# Patient Record
Sex: MALE | Race: ASIAN
[De-identification: names, ages, dates, MRNs, and addresses within clinical notes are randomized per-mention and may not be internally consistent; named-entity substitution may affect disease eponyms.]

---

## 2020-12-03 ENCOUNTER — HOSPITAL ENCOUNTER (INPATIENT)
Dept: HOSPITAL 72 - EMR | Age: 70
LOS: 5 days | Discharge: NURSING, REHAB OR LONG-TERM CARE | DRG: 871 | End: 2020-12-08
Payer: MEDICARE

## 2020-12-03 VITALS — WEIGHT: 114 LBS | HEIGHT: 68 IN | BODY MASS INDEX: 17.28 KG/M2

## 2020-12-03 VITALS — DIASTOLIC BLOOD PRESSURE: 76 MMHG | SYSTOLIC BLOOD PRESSURE: 110 MMHG

## 2020-12-03 VITALS — DIASTOLIC BLOOD PRESSURE: 58 MMHG | SYSTOLIC BLOOD PRESSURE: 126 MMHG

## 2020-12-03 VITALS — DIASTOLIC BLOOD PRESSURE: 68 MMHG | SYSTOLIC BLOOD PRESSURE: 123 MMHG

## 2020-12-03 VITALS — DIASTOLIC BLOOD PRESSURE: 54 MMHG | SYSTOLIC BLOOD PRESSURE: 120 MMHG

## 2020-12-03 VITALS — SYSTOLIC BLOOD PRESSURE: 111 MMHG | DIASTOLIC BLOOD PRESSURE: 63 MMHG

## 2020-12-03 VITALS — SYSTOLIC BLOOD PRESSURE: 98 MMHG | DIASTOLIC BLOOD PRESSURE: 55 MMHG

## 2020-12-03 VITALS — DIASTOLIC BLOOD PRESSURE: 64 MMHG | SYSTOLIC BLOOD PRESSURE: 118 MMHG

## 2020-12-03 VITALS — DIASTOLIC BLOOD PRESSURE: 62 MMHG | SYSTOLIC BLOOD PRESSURE: 127 MMHG

## 2020-12-03 VITALS — DIASTOLIC BLOOD PRESSURE: 42 MMHG | SYSTOLIC BLOOD PRESSURE: 78 MMHG

## 2020-12-03 VITALS — DIASTOLIC BLOOD PRESSURE: 53 MMHG | SYSTOLIC BLOOD PRESSURE: 95 MMHG

## 2020-12-03 VITALS — SYSTOLIC BLOOD PRESSURE: 112 MMHG | DIASTOLIC BLOOD PRESSURE: 49 MMHG

## 2020-12-03 VITALS — SYSTOLIC BLOOD PRESSURE: 131 MMHG | DIASTOLIC BLOOD PRESSURE: 64 MMHG

## 2020-12-03 VITALS — DIASTOLIC BLOOD PRESSURE: 54 MMHG | SYSTOLIC BLOOD PRESSURE: 104 MMHG

## 2020-12-03 VITALS — DIASTOLIC BLOOD PRESSURE: 70 MMHG | SYSTOLIC BLOOD PRESSURE: 108 MMHG

## 2020-12-03 VITALS — DIASTOLIC BLOOD PRESSURE: 43 MMHG | SYSTOLIC BLOOD PRESSURE: 122 MMHG

## 2020-12-03 VITALS — DIASTOLIC BLOOD PRESSURE: 51 MMHG | SYSTOLIC BLOOD PRESSURE: 95 MMHG

## 2020-12-03 VITALS — SYSTOLIC BLOOD PRESSURE: 94 MMHG | DIASTOLIC BLOOD PRESSURE: 50 MMHG

## 2020-12-03 DIAGNOSIS — E87.8: ICD-10-CM

## 2020-12-03 DIAGNOSIS — Z22.322: ICD-10-CM

## 2020-12-03 DIAGNOSIS — E46: ICD-10-CM

## 2020-12-03 DIAGNOSIS — N39.0: ICD-10-CM

## 2020-12-03 DIAGNOSIS — D64.9: ICD-10-CM

## 2020-12-03 DIAGNOSIS — I25.10: ICD-10-CM

## 2020-12-03 DIAGNOSIS — R65.21: ICD-10-CM

## 2020-12-03 DIAGNOSIS — K76.6: ICD-10-CM

## 2020-12-03 DIAGNOSIS — G92: ICD-10-CM

## 2020-12-03 DIAGNOSIS — Z79.82: ICD-10-CM

## 2020-12-03 DIAGNOSIS — A41.9: Primary | ICD-10-CM

## 2020-12-03 DIAGNOSIS — K74.60: ICD-10-CM

## 2020-12-03 DIAGNOSIS — J98.11: ICD-10-CM

## 2020-12-03 DIAGNOSIS — I45.10: ICD-10-CM

## 2020-12-03 DIAGNOSIS — Z86.19: ICD-10-CM

## 2020-12-03 DIAGNOSIS — N17.9: ICD-10-CM

## 2020-12-03 DIAGNOSIS — I50.9: ICD-10-CM

## 2020-12-03 DIAGNOSIS — K72.90: ICD-10-CM

## 2020-12-03 DIAGNOSIS — Z86.73: ICD-10-CM

## 2020-12-03 DIAGNOSIS — R13.10: ICD-10-CM

## 2020-12-03 DIAGNOSIS — M62.82: ICD-10-CM

## 2020-12-03 DIAGNOSIS — F03.91: ICD-10-CM

## 2020-12-03 DIAGNOSIS — K21.9: ICD-10-CM

## 2020-12-03 DIAGNOSIS — J96.91: ICD-10-CM

## 2020-12-03 DIAGNOSIS — E78.5: ICD-10-CM

## 2020-12-03 DIAGNOSIS — E86.0: ICD-10-CM

## 2020-12-03 LAB
ADD MANUAL DIFF: NO
ALBUMIN SERPL-MCNC: 2.5 G/DL (ref 3.4–5)
ALBUMIN/GLOB SERPL: 0.6 {RATIO} (ref 1–2.7)
ALP SERPL-CCNC: 74 U/L (ref 46–116)
ALT SERPL-CCNC: 20 U/L (ref 12–78)
ANION GAP SERPL CALC-SCNC: 10 MMOL/L (ref 5–15)
APPEARANCE UR: CLEAR
APTT BLD: 31 SEC (ref 23–33)
APTT PPP: YELLOW S
AST SERPL-CCNC: 82 U/L (ref 15–37)
BASOPHILS NFR BLD AUTO: 0.9 % (ref 0–2)
BILIRUB SERPL-MCNC: 0.7 MG/DL (ref 0.2–1)
BUN SERPL-MCNC: 35 MG/DL (ref 7–18)
CALCIUM SERPL-MCNC: 8.4 MG/DL (ref 8.5–10.1)
CHLORIDE SERPL-SCNC: 107 MMOL/L (ref 98–107)
CK MB SERPL-MCNC: 36 NG/ML (ref 0–3.6)
CO2 SERPL-SCNC: 22 MMOL/L (ref 21–32)
CREAT SERPL-MCNC: 1.5 MG/DL (ref 0.55–1.3)
EOSINOPHIL NFR BLD AUTO: 2 % (ref 0–3)
ERYTHROCYTE [DISTWIDTH] IN BLOOD BY AUTOMATED COUNT: 16.7 % (ref 11.6–14.8)
GLOBULIN SER-MCNC: 4 G/DL
GLUCOSE UR STRIP-MCNC: NEGATIVE MG/DL
HCT VFR BLD CALC: 27.9 % (ref 42–52)
HGB BLD-MCNC: 9.5 G/DL (ref 14.2–18)
INR PPP: 1.1 (ref 0.9–1.1)
KETONES UR QL STRIP: (no result)
LEUKOCYTE ESTERASE UR QL STRIP: NEGATIVE
LYMPHOCYTES NFR BLD AUTO: 20.6 % (ref 20–45)
MCV RBC AUTO: 90 FL (ref 80–99)
MONOCYTES NFR BLD AUTO: 11.5 % (ref 1–10)
NEUTROPHILS NFR BLD AUTO: 65.1 % (ref 45–75)
NITRITE UR QL STRIP: NEGATIVE
PH UR STRIP: 6 [PH] (ref 4.5–8)
PHOSPHATE SERPL-MCNC: 3.2 MG/DL (ref 2.5–4.9)
PLATELET # BLD: 161 K/UL (ref 150–450)
POTASSIUM SERPL-SCNC: 4.3 MMOL/L (ref 3.5–5.1)
PROT UR QL STRIP: (no result)
RBC # BLD AUTO: 3.1 M/UL (ref 4.7–6.1)
SODIUM SERPL-SCNC: 139 MMOL/L (ref 136–145)
SP GR UR STRIP: 1.01 (ref 1–1.03)
UROBILINOGEN UR-MCNC: 4 MG/DL (ref 0–1)
WBC # BLD AUTO: 11.5 K/UL (ref 4.8–10.8)

## 2020-12-03 PROCEDURE — 86803 HEPATITIS C AB TEST: CPT

## 2020-12-03 PROCEDURE — 99285 EMERGENCY DEPT VISIT HI MDM: CPT

## 2020-12-03 PROCEDURE — 93970 EXTREMITY STUDY: CPT

## 2020-12-03 PROCEDURE — 83880 ASSAY OF NATRIURETIC PEPTIDE: CPT

## 2020-12-03 PROCEDURE — 82607 VITAMIN B-12: CPT

## 2020-12-03 PROCEDURE — 86140 C-REACTIVE PROTEIN: CPT

## 2020-12-03 PROCEDURE — 82140 ASSAY OF AMMONIA: CPT

## 2020-12-03 PROCEDURE — 85007 BL SMEAR W/DIFF WBC COUNT: CPT

## 2020-12-03 PROCEDURE — 85025 COMPLETE CBC W/AUTO DIFF WBC: CPT

## 2020-12-03 PROCEDURE — 83550 IRON BINDING TEST: CPT

## 2020-12-03 PROCEDURE — 87181 SC STD AGAR DILUTION PER AGT: CPT

## 2020-12-03 PROCEDURE — 83540 ASSAY OF IRON: CPT

## 2020-12-03 PROCEDURE — 71045 X-RAY EXAM CHEST 1 VIEW: CPT

## 2020-12-03 PROCEDURE — 84443 ASSAY THYROID STIM HORMONE: CPT

## 2020-12-03 PROCEDURE — 82977 ASSAY OF GGT: CPT

## 2020-12-03 PROCEDURE — 80053 COMPREHEN METABOLIC PANEL: CPT

## 2020-12-03 PROCEDURE — 83605 ASSAY OF LACTIC ACID: CPT

## 2020-12-03 PROCEDURE — 82728 ASSAY OF FERRITIN: CPT

## 2020-12-03 PROCEDURE — 81003 URINALYSIS AUTO W/O SCOPE: CPT

## 2020-12-03 PROCEDURE — 84484 ASSAY OF TROPONIN QUANT: CPT

## 2020-12-03 PROCEDURE — 82550 ASSAY OF CK (CPK): CPT

## 2020-12-03 PROCEDURE — 84100 ASSAY OF PHOSPHORUS: CPT

## 2020-12-03 PROCEDURE — 82533 TOTAL CORTISOL: CPT

## 2020-12-03 PROCEDURE — 86705 HEP B CORE ANTIBODY IGM: CPT

## 2020-12-03 PROCEDURE — 84439 ASSAY OF FREE THYROXINE: CPT

## 2020-12-03 PROCEDURE — 82803 BLOOD GASES ANY COMBINATION: CPT

## 2020-12-03 PROCEDURE — 74018 RADEX ABDOMEN 1 VIEW: CPT

## 2020-12-03 PROCEDURE — 85730 THROMBOPLASTIN TIME PARTIAL: CPT

## 2020-12-03 PROCEDURE — 87340 HEPATITIS B SURFACE AG IA: CPT

## 2020-12-03 PROCEDURE — 36415 COLL VENOUS BLD VENIPUNCTURE: CPT

## 2020-12-03 PROCEDURE — 85610 PROTHROMBIN TIME: CPT

## 2020-12-03 PROCEDURE — 86710 INFLUENZA VIRUS ANTIBODY: CPT

## 2020-12-03 PROCEDURE — 87040 BLOOD CULTURE FOR BACTERIA: CPT

## 2020-12-03 PROCEDURE — 83690 ASSAY OF LIPASE: CPT

## 2020-12-03 PROCEDURE — 80202 ASSAY OF VANCOMYCIN: CPT

## 2020-12-03 PROCEDURE — 80061 LIPID PANEL: CPT

## 2020-12-03 PROCEDURE — 96365 THER/PROPH/DIAG IV INF INIT: CPT

## 2020-12-03 PROCEDURE — 87081 CULTURE SCREEN ONLY: CPT

## 2020-12-03 PROCEDURE — 76700 US EXAM ABDOM COMPLETE: CPT

## 2020-12-03 PROCEDURE — 82746 ASSAY OF FOLIC ACID SERUM: CPT

## 2020-12-03 PROCEDURE — 96367 TX/PROPH/DG ADDL SEQ IV INF: CPT

## 2020-12-03 PROCEDURE — 93306 TTE W/DOPPLER COMPLETE: CPT

## 2020-12-03 PROCEDURE — 83036 HEMOGLOBIN GLYCOSYLATED A1C: CPT

## 2020-12-03 PROCEDURE — 96361 HYDRATE IV INFUSION ADD-ON: CPT

## 2020-12-03 PROCEDURE — 93005 ELECTROCARDIOGRAM TRACING: CPT

## 2020-12-03 PROCEDURE — 82553 CREATINE MB FRACTION: CPT

## 2020-12-03 PROCEDURE — 86709 HEPATITIS A IGM ANTIBODY: CPT

## 2020-12-03 PROCEDURE — 83735 ASSAY OF MAGNESIUM: CPT

## 2020-12-03 PROCEDURE — 84550 ASSAY OF BLOOD/URIC ACID: CPT

## 2020-12-03 RX ADMIN — CHLORHEXIDINE GLUCONATE SCH APPLIC: 213 SOLUTION TOPICAL at 20:01

## 2020-12-03 RX ADMIN — Medication SCH MLS/HR: at 17:35

## 2020-12-03 RX ADMIN — PANTOPRAZOLE SODIUM SCH MG: 40 INJECTION, POWDER, FOR SOLUTION INTRAVENOUS at 20:05

## 2020-12-03 RX ADMIN — HUMAN INSULIN SCH MLS/HR: 100 INJECTION, SOLUTION SUBCUTANEOUS at 20:01

## 2020-12-03 NOTE — CARDIAC ELECTROPHYSIOLOGY PN
Subjective


Subjective


[id number]





Objective





Last 24 Hour Vital Signs








  Date Time  Temp Pulse Resp B/P (MAP) Pulse Ox O2 Delivery O2 Flow Rate FiO2


 


12/3/20 15:40    125/70    


 


12/3/20 15:25    110/62    


 


12/3/20 15:20    89/43    


 


12/3/20 14:20 100.1 64 15 78/42 100 Nasal Cannula 2.0 


 


12/3/20 13:50  87 18   Nasal Cannula 4.0 98











Laboratory Tests








Test


 12/3/20


13:35 12/3/20


14:39 12/3/20


15:00


 


White Blood Count


 11.5 K/UL


(4.8-10.8)  H 


 





 


Red Blood Count


 3.10 M/UL


(4.70-6.10)  L 


 





 


Hemoglobin


 9.5 G/DL


(14.2-18.0)  L 


 





 


Hematocrit


 27.9 %


(42.0-52.0)  L 


 





 


Mean Corpuscular Volume 90 FL (80-99)    


 


Mean Corpuscular Hemoglobin


 30.8 PG


(27.0-31.0) 


 





 


Mean Corpuscular Hemoglobin


Concent 34.2 G/DL


(32.0-36.0) 


 





 


Red Cell Distribution Width


 16.7 %


(11.6-14.8)  H 


 





 


Platelet Count


 161 K/UL


(150-450) 


 





 


Mean Platelet Volume


 6.1 FL


(6.5-10.1)  L 


 





 


Neutrophils (%) (Auto)


 65.1 %


(45.0-75.0) 


 





 


Lymphocytes (%) (Auto)


 20.6 %


(20.0-45.0) 


 





 


Monocytes (%) (Auto)


 11.5 %


(1.0-10.0)  H 


 





 


Eosinophils (%) (Auto)


 2.0 %


(0.0-3.0) 


 





 


Basophils (%) (Auto)


 0.9 %


(0.0-2.0) 


 





 


Prothrombin Time


 12.3 SEC


(9.30-11.50)  H 


 





 


Prothromb Time International


Ratio 1.1 (0.9-1.1)  


 


 





 


Activated Partial


Thromboplast Time 31 SEC (23-33)


 


 





 


Sodium Level


 139 MMOL/L


(136-145) 


 





 


Potassium Level


 4.3 MMOL/L


(3.5-5.1) 


 





 


Chloride Level


 107 MMOL/L


() 


 





 


Carbon Dioxide Level


 22 MMOL/L


(21-32) 


 





 


Anion Gap


 10 mmol/L


(5-15) 


 





 


Blood Urea Nitrogen


 35 mg/dL


(7-18)  H 


 





 


Creatinine


 1.5 MG/DL


(0.55-1.30)  H 


 





 


Estimat Glomerular Filtration


Rate 46.0 mL/min


(>60) 


 





 


Glucose Level


 102 MG/DL


() 


 





 


Lactic Acid Level


 2.80 mmol/L


(0.4-2.0)  H 


 2.90 mmol/L


(0.66-2.22)  H


 


Calcium Level


 8.4 MG/DL


(8.5-10.1)  L 


 





 


Phosphorus Level


 3.2 MG/DL


(2.5-4.9) 


 





 


Magnesium Level


 2.0 MG/DL


(1.8-2.4) 


 





 


Total Bilirubin


 0.7 MG/DL


(0.2-1.0) 


 





 


Aspartate Amino Transf


(AST/SGOT) 82 U/L (15-37)


H 


 





 


Alanine Aminotransferase


(ALT/SGPT) 20 U/L (12-78)


 


 





 


Alkaline Phosphatase


 74 U/L


() 


 





 


Total Creatine Kinase


 2698 U/L


()  H 


 





 


Creatine Kinase MB


 36.0 NG/ML


(0.0-3.6)  H 


 





 


Creatine Kinase MB Relative


Index 1.3  


 


 





 


Troponin I


 0.038 ng/mL


(0.000-0.056) 


 





 


Total Protein


 6.5 G/DL


(6.4-8.2) 


 





 


Albumin


 2.5 G/DL


(3.4-5.0)  L 


 





 


Globulin 4.0 g/dL    


 


Albumin/Globulin Ratio


 0.6 (1.0-2.7)


L 


 





 


Arterial Blood pH


 


 7.509


(7.350-7.450) 





 


Arterial Blood Partial


Pressure CO2 


 21.4 mmHg


(35.0-45.0)  *L 





 


Arterial Blood Partial


Pressure O2 


 172.9 mmHg


(75.0-100.0)  H 





 


Arterial Blood HCO3


 


 16.7 mmol/L


(22.0-26.0)  *L 





 


Arterial Blood Oxygen


Saturation 


 98.7 %


() 





 


Arterial Blood Base Excess  -0.5 (-2-2)   


 


Florentin Test  Positive   


 


Urine Color   Yellow  


 


Urine Appearance   Clear  


 


Urine pH   6 (4.5-8.0)  


 


Urine Specific Gravity


 


 


 1.010


(1.005-1.035)


 


Urine Protein


 


 


 1+ (NEGATIVE)


H


 


Urine Glucose (UA)


 


 


 Negative


(NEGATIVE)


 


Urine Ketones


 


 


 1+ (NEGATIVE)


H


 


Urine Blood


 


 


 1+ (NEGATIVE)


H


 


Urine Nitrite


 


 


 Negative


(NEGATIVE)


 


Urine Bilirubin


 


 


 Negative


(NEGATIVE)


 


Urine Urobilinogen


 


 


 4 MG/DL


(0.0-1.0)  H


 


Urine Leukocyte Esterase


 


 


 Negative


(NEGATIVE)


 


Urine RBC


 


 


 2-4 /HPF (0 -


0)  H


 


Urine WBC


 


 


 0-2 /HPF (0 -


0)


 


Urine Squamous Epithelial


Cells 


 


 Occasional


/LPF


 


Urine Bacteria


 


 


 None /HPF


(NONE)











Microbiology








 Date/Time


Source Procedure


Growth Status





 


 12/3/20 14:05


Nasal Nares - Final Complete


 


 12/3/20 14:05


Nasal Nares - Final Complete

















Levi Otero MD                Dec 3, 2020 16:19

## 2020-12-03 NOTE — CONSULTATION
DATE OF CONSULTATION:  12/03/2020

PULMONARY CONSULTATION



HISTORY OF PRESENT ILLNESS:  This is a 70-year-old male who was sent from a

nursing facility with fever.  The patient has a previous history of

schizophrenia and CVA.  He is currently full code.  He is noted to be

febrile.  He is unable to provide any further history.



PAST HISTORY:  CHF, CAD, anemia, chronic dysphagia, hep C, previous CVA,

history of hepatic issues and hepatic encephalopathy.



CURRENT MEDICATIONS:  Cefepime, Levophed, Flagyl, vancomycin, and IV

fluids.



REVIEW OF SYSTEMS:  Not obtainable.



SURGERIES:  None reported.



PHYSICAL EXAMINATION:

GENERAL:  A 70-year-old male.

HEENT:  Unremarkable.

LUNGS:  Clear breath sounds bilaterally.

ABDOMEN:  Soft.

EXTREMITIES:  There is no edema.

VITAL SIGNS:  Blood pressure is 70/40, after Levophed is 90/40.  Heart rate

is 54, respirations are 16, O2 sat 99% on 2 L oxygen, T-max 100.1

rectally.



LABORATORY DATA:  Lab testing shows white count 11.5, hemoglobin 11.5.

Creatinine 1.5.  Lactic acid 2.8, total CK 2698, troponin 0.03.  Albumin

2.5.



IMAGING STUDIES:  X-ray chest obtained by the ER physician per report does

not show any acute pathology.  The patient had a triple-lumen catheter

placed and has been started on pressors.



IMPRESSION:

1. Shock, likely septic.

2. Fever.

3. History of CVA.

4. CHF.

5. History of hep C.



DISCUSSION:  Admit to the hospital.  Start broad-spectrum antibiotics.  IV

fluids.  Pressors.  DVT and GI prophylaxes.  We will follow carefully.

Currently saturating well on low-flow oxygen.









  ______________________________________________

  Edu Evans M.D.





DR:  MARISA

D:  12/03/2020 16:19

T:  12/03/2020 17:29

JOB#:  [id number]

CC:

## 2020-12-03 NOTE — DIAGNOSTIC IMAGING REPORT
Indication: Shortness of breath

 

Technique: One view of the chest

 

Comparison: none

 

Findings: There is atelectasis of the right lung base. The heart is enlarged. The

aorta is torturous ectatic and calcified. There is surgical hardware in the cervical

spine

 

Impression:

## 2020-12-03 NOTE — CONSULTATION
Consult Note


Consult Note


I am asked to evaluate the patient at the request of Dr. Wong,


Patient seen in ICU room G


Discussed with SVETLANA Tay.





Patient is a 70-year-old male sent in from nursing facility after increased f

ever.  Had been noted to be chronically debilitated due to CVAs as well as prior

schizophrenia.  Had multiple medical problems in the past.  Patient was noted to

be full code.  Had fever up to 101 at the facility.  Was noted to be minimally 

verbal and reportedly normally speaks English.  History is obtained from EMS and

medical record.





Allergies:  No Known Allergies (Unverified , 8/19/18)





COVID-19 Screening


Contact w/high risk pt:  Yes


Experienced COVID-19 symptoms?:  Yes


COVID-19 Testing performed PTA:  Yes


COVID-19 Screening:  Negative COVID-19


COVID-19 Testing Source:  at facility








Reviewed Nursing Documentation:  PMH: Agreed; PSxH: Agreed





Hx Cardiac Problems:  Yes - CHF, ANGINA PECTORIS


Hx Hypertension:  Yes - ANEMIA


Hx Gastrointestinal Problems:  Yes - hep C, DYSPHAGIA


Hx Neurological Problems:  Yes - HEPATIC ENCEPHALOPATHY








PHYSICAL EXAMINATION:


VITAL SIGNS:  T-max is 100.1, current temperature 98.5.


blood pressure of 78/42, on Levophed it is 125/70, pulse


is 64, respirations 18, temperature 100.1.


HEAD AND NECK:  Pink conjunctivae.


HEART:  Bradycardic.  Has right IJ central line.


LUNGS:  Clear.  Some basilar rhonchi


ABDOMEN:  Soft, nontender.


EXTREMITIES:  No edema.





LABORATORY DATA:  WBC at the time of admission was elevated, hemoglobin 9,


hematocrit 26.1, platelets 141.  Sodium 142, potassium 4, chloride 113,


bicarb 21, BUN 26, creatinine 1.1.  Lactic acid is elevated.  BUN was 35,


creatinine was 1.5 at the time of admission.  CK level was elevated 2698.


UA showed wbc of 0 to 2.





Chest x-ray, atelectasis in right base





.





.


Assessment/Plan





Acute renal failure


Dehydration


Anemia


Sepsis, shock


UTI


Toxic metabolic encephalopathy


Hypoalbuminemia, malnutrition with BMI of 17.3


Elevated CPK, rhabdo











N.p.o.


Hydrate


Pressors


Ndiaye


Accurate intake and output


Avoid nephrotoxic's


Monitor renal parameters


Per orders


Per consultants











Alvin Alegria MD             Dec 3, 2020 19:15

## 2020-12-03 NOTE — EMERGENCY ROOM REPORT
History of Present Illness


General


Chief Complaint:  Fever


Source:  Medical Record, EMS





Present Illness


HPI


Patient is a 70-year-old male sent in from nursing facility after increased 

fever.  Had been noted to be chronically debilitated due to CVAs as well as 

prior schizophrenia.  Had multiple medical problems in the past.  Patient was 

noted to be full code.  Had fever up to 101 at the facility.  Was noted to be 

minimally verbal and reportedly normally speaks English.  History is obtained 

from EMS and medical record.


Allergies:  


Coded Allergies:  


     No Known Allergies (Unverified , 8/19/18)





COVID-19 Screening


Contact w/high risk pt:  Yes


Experienced COVID-19 symptoms?:  Yes


COVID-19 Testing performed PTA:  Yes


COVID-19 Screening:  Negative COVID-19


COVID-19 Testing Source:  at facility





Patient History


Reviewed Nursing Documentation:  PMH: Agreed; PSxH: Agreed





Nursing Documentation-PMH


Hx Cardiac Problems:  Yes - CHF, ANGINA PECTORIS


Hx Hypertension:  Yes - ANEMIA


Hx Cancer:  No


Hx Gastrointestinal Problems:  Yes - hep C, DYSPHAGIA


Hx Neurological Problems:  Yes - HEPATIC ENCEPHALOPATHY





Review of Systems


All Other Systems:  limited - Limited by poor historian





Physical Exam


General Appearance:  alert, Chronically Ill


Eyes:  bilateral eye PERRL


ENT:  dry mucus membranes


Neck:  limited range of motion


Respiratory:  chest non-tender, rhonchi


Cardiovascular #1:  normal inspection, no edema


Gastrointestinal:  normal inspection, soft


Musculoskeletal:  other - Bilateral upper and lower extremity contractures


Neurologic:  alert, other - Decreased range of motion both upper and lower 

extremities with contractures


Psychiatric:  normal inspection


Skin:  no rash





Procedures


Critical Care Time


Critical Care Time


Patient had a critical medical condition which untreated could potentially 

result in life or limb threatening injury.  Total  critical care time excluding 

procedures approximately 45 minutes.





Central Line


Central Line :  


   Consent:  Emergent


   Maximal Sterile Barrier Tech:  yes cap, yes mask, yes sterile gown, yes 

sterile gloves, yes large sterile sheet, yes hand hygiene, yes chlorhexidine 

prep


   No Max Barrier Tech Because:  emergency insertion


   Central Line Postion:  internal jugular (R)


   Anesthesia:  Lidocaine


   cc's of anesthesia:  5


   US Guided Line?:  Yes


   Vessel visualized with U/S:  Right Internal Jugular


   Ultrasound Findings:  Collapsible Vessel


   Complications:  none


   Central Line Post Position:  sutured, good blood return, position confirmed 

w/ CXR


   Attempts:  Other - attempt right subclavian unsuccessful X1


   Patient Tolerated:  Well


   Complications:  None





Medical Decision Making


Diagnostic Impression:  


   Primary Impression:  


   Dehydration


   Additional Impressions:  


   Acute febrile illness


   Septic shock


ER Course


Patient presented for increased fever and generalized weakness.  Differential 

diagnosis include was not limited to pneumonia, coronavirus infection, urinary 

tract infection, bowel obstruction among others.  Because of complexity of 

patient's case laboratory tests and imaging studies were ordered.   Patient's 

laboratory testing showed elevated white blood count.  Coronavirus testing was 

sent.  Urinalysis showed no evidence of urinary infection.  Chest x-ray showed 

findings consistent with pneumonia.Patient noted to be initially febrile and 

hypotensive when brought in by EMS.  He had further worsening blood pressure and

was started on IV fluids initially.  Patient was noted to have full CODE STATUS.

 A central venous catheter was placed emergently due to patient's hypotension 

and shock.  EKG interpreted by me showed normal sinus rhythm with a rate of 71 

with a right bundle branch block.  Dr. Martin Payan was contacted for inpatient 

management.


EKG Diagnostic Results


Rate:  normal


Rhythm:  NSR


ST Segments:  no acute changes


Status:  unchanged


Disposition:  ADMITTED AS INPATIENT


Condition:  Critical











Eris Mata MD                Dec 3, 2020 13:41

## 2020-12-03 NOTE — DIAGNOSTIC IMAGING REPORT
Indication: Post line placement

 

Technique: One view of the chest

 

Comparison: 2 hours earlier

 

Findings: Interval placement of a right jugular central venous catheter, tip

projected the level of the cavoatrial junction. No pneumothorax. No definite acute

infiltrates, effusion, or congestion. Heart size is upper limits of normal with a

left ventricular hypertrophy configuration. The aorta is tortuous and calcified.

Surgical hardware is seen in the neck

 

Impression: Status post central venous catheter placement, no radiographically

evident complication, tip in good position

 

Otherwise stable findings as described

## 2020-12-03 NOTE — CONSULTATION
DATE OF CONSULTATION:  12/03/2020

CARDIOLOGY CONSULTATION



CONSULTING PHYSICIAN:  Levi Otero MD



REFERRING PHYSICIAN:  Martin Payan MD



REASON FOR CONSULTATION:  Septic shock.



HISTORY OF PRESENT ILLNESS:  Patient is a 70-year-old  gentleman

with history of hypertension and anemia and congestive heart failure as

well as history of hepatitis C, history of hepatic encephalopathy who was

sent from nursing home for increased fever.  Patient is chronically

debilitated with CVAs and prior schizophrenia.  Patient is Full Code and

temperature of 101 at the facility.  At the time of my evaluation, patient

is in emergency room and is minimally verbal and was hypotensive with

blood pressure of 70 and already had undergone a right IJ central line

placement.  Patient was started on Levophed as his blood pressure was in

the 70s.



REVIEW OF SYSTEMS:  Cannot be obtained.



PAST MEDICAL HISTORY:  As mentioned above.



FAMILY HISTORY:  Noncontributory.



SOCIAL HISTORY:  Nursing home resident.  No known history of drug use.



PHYSICAL EXAMINATION:

VITAL SIGNS:  Show blood pressure of 78/42, on Levophed it is 125/70, pulse

is 64, respirations 18, temperature 100.1.

HEAD AND NECK:  Shows no JVD.

LUNGS:  Coarse rhonchi.

CARDIOVASCULAR:  Shows regular S1 and S2 with no gallop or murmur.

ABDOMEN:  Soft.

EXTREMITIES:  No pitting edema.



LABORATORY AND DIAGNOSTIC STUDIES:  It is of note that the patient's prior

echo in August of 2020 showed EF of 65%.  His current laboratories show

white count 11.5, hemoglobin 9.5, hematocrit 28, and platelet count of

161.  Sodium is 139, potassium 4.3, BUN of 35, creatinine 1.5.  Lactic

acid is 2.9.  First troponin is negative.



ASSESSMENT AND PLAN:

1. Hypotension due to septic shock in this patient with lactic acid of

2.9 and white count of 11.5.  Patient is being ruled out for COVID.

Influenza is negative.  Patient will be transferred to intensive care unit

for septic shock and started on IV fluids as the patient seems likely to

be dehydrated as well.  Patient will be ruled out for myocardial

infarction.  An echocardiogram will be obtained.

2. Fever and cough and respiratory failure.  Patient will be started on

IV antibiotic per ID.  Patient already received antibiotic in the

emergency room.

3. Anemia.  Hemoglobin 9.5.

4. Renal failure with BUN of 35, creatinine of 1.5.  Start the patient

on IV fluid.

5. Rhabdomyolysis with CPK of 2700.

6. History of congestive heart failure, but recent echo about 3 months

ago showed normal left ventricular systolic function.

7. 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

8. Complete right bundle-branch block and history of bradycardia with

heart rate in the 50s.  Off any sinus or AV jonathan blocking agents.  We

will watch the patient on telemetry.



Thank you very much for allowing me to participate in the care of this

patient.  Please do not hesitate to contact me for any questions regarding

my evaluation.  The case was discussed with the emergency room

physician.









  ______________________________________________

  Levi Otero M.D.





DR:  VELIA

D:  12/03/2020 16:21

T:  12/03/2020 17:17

JOB#:  [id number]

CC:

## 2020-12-04 VITALS — DIASTOLIC BLOOD PRESSURE: 76 MMHG | SYSTOLIC BLOOD PRESSURE: 123 MMHG

## 2020-12-04 VITALS — DIASTOLIC BLOOD PRESSURE: 58 MMHG | SYSTOLIC BLOOD PRESSURE: 109 MMHG

## 2020-12-04 VITALS — SYSTOLIC BLOOD PRESSURE: 97 MMHG | DIASTOLIC BLOOD PRESSURE: 54 MMHG

## 2020-12-04 VITALS — DIASTOLIC BLOOD PRESSURE: 48 MMHG | SYSTOLIC BLOOD PRESSURE: 87 MMHG

## 2020-12-04 VITALS — SYSTOLIC BLOOD PRESSURE: 102 MMHG | DIASTOLIC BLOOD PRESSURE: 67 MMHG

## 2020-12-04 VITALS — SYSTOLIC BLOOD PRESSURE: 96 MMHG | DIASTOLIC BLOOD PRESSURE: 61 MMHG

## 2020-12-04 VITALS — DIASTOLIC BLOOD PRESSURE: 71 MMHG | SYSTOLIC BLOOD PRESSURE: 131 MMHG

## 2020-12-04 VITALS — SYSTOLIC BLOOD PRESSURE: 96 MMHG | DIASTOLIC BLOOD PRESSURE: 54 MMHG

## 2020-12-04 VITALS — SYSTOLIC BLOOD PRESSURE: 97 MMHG | DIASTOLIC BLOOD PRESSURE: 52 MMHG

## 2020-12-04 VITALS — DIASTOLIC BLOOD PRESSURE: 63 MMHG | SYSTOLIC BLOOD PRESSURE: 93 MMHG

## 2020-12-04 VITALS — SYSTOLIC BLOOD PRESSURE: 102 MMHG | DIASTOLIC BLOOD PRESSURE: 59 MMHG

## 2020-12-04 VITALS — SYSTOLIC BLOOD PRESSURE: 98 MMHG | DIASTOLIC BLOOD PRESSURE: 55 MMHG

## 2020-12-04 VITALS — SYSTOLIC BLOOD PRESSURE: 114 MMHG | DIASTOLIC BLOOD PRESSURE: 65 MMHG

## 2020-12-04 VITALS — DIASTOLIC BLOOD PRESSURE: 66 MMHG | SYSTOLIC BLOOD PRESSURE: 126 MMHG

## 2020-12-04 VITALS — DIASTOLIC BLOOD PRESSURE: 54 MMHG | SYSTOLIC BLOOD PRESSURE: 95 MMHG

## 2020-12-04 VITALS — SYSTOLIC BLOOD PRESSURE: 102 MMHG | DIASTOLIC BLOOD PRESSURE: 57 MMHG

## 2020-12-04 VITALS — SYSTOLIC BLOOD PRESSURE: 94 MMHG | DIASTOLIC BLOOD PRESSURE: 53 MMHG

## 2020-12-04 VITALS — DIASTOLIC BLOOD PRESSURE: 67 MMHG | SYSTOLIC BLOOD PRESSURE: 113 MMHG

## 2020-12-04 VITALS — DIASTOLIC BLOOD PRESSURE: 59 MMHG | SYSTOLIC BLOOD PRESSURE: 105 MMHG

## 2020-12-04 VITALS — DIASTOLIC BLOOD PRESSURE: 56 MMHG | SYSTOLIC BLOOD PRESSURE: 93 MMHG

## 2020-12-04 VITALS — DIASTOLIC BLOOD PRESSURE: 52 MMHG | SYSTOLIC BLOOD PRESSURE: 88 MMHG

## 2020-12-04 VITALS — SYSTOLIC BLOOD PRESSURE: 105 MMHG | DIASTOLIC BLOOD PRESSURE: 60 MMHG

## 2020-12-04 VITALS — DIASTOLIC BLOOD PRESSURE: 64 MMHG | SYSTOLIC BLOOD PRESSURE: 106 MMHG

## 2020-12-04 VITALS — SYSTOLIC BLOOD PRESSURE: 104 MMHG | DIASTOLIC BLOOD PRESSURE: 62 MMHG

## 2020-12-04 VITALS — DIASTOLIC BLOOD PRESSURE: 55 MMHG | SYSTOLIC BLOOD PRESSURE: 92 MMHG

## 2020-12-04 VITALS — DIASTOLIC BLOOD PRESSURE: 52 MMHG | SYSTOLIC BLOOD PRESSURE: 96 MMHG

## 2020-12-04 VITALS — DIASTOLIC BLOOD PRESSURE: 59 MMHG | SYSTOLIC BLOOD PRESSURE: 95 MMHG

## 2020-12-04 VITALS — SYSTOLIC BLOOD PRESSURE: 104 MMHG | DIASTOLIC BLOOD PRESSURE: 82 MMHG

## 2020-12-04 VITALS — DIASTOLIC BLOOD PRESSURE: 54 MMHG | SYSTOLIC BLOOD PRESSURE: 120 MMHG

## 2020-12-04 VITALS — DIASTOLIC BLOOD PRESSURE: 63 MMHG | SYSTOLIC BLOOD PRESSURE: 103 MMHG

## 2020-12-04 VITALS — SYSTOLIC BLOOD PRESSURE: 94 MMHG | DIASTOLIC BLOOD PRESSURE: 48 MMHG

## 2020-12-04 VITALS — DIASTOLIC BLOOD PRESSURE: 62 MMHG | SYSTOLIC BLOOD PRESSURE: 103 MMHG

## 2020-12-04 VITALS — DIASTOLIC BLOOD PRESSURE: 54 MMHG | SYSTOLIC BLOOD PRESSURE: 103 MMHG

## 2020-12-04 VITALS — DIASTOLIC BLOOD PRESSURE: 53 MMHG | SYSTOLIC BLOOD PRESSURE: 105 MMHG

## 2020-12-04 VITALS — DIASTOLIC BLOOD PRESSURE: 52 MMHG | SYSTOLIC BLOOD PRESSURE: 99 MMHG

## 2020-12-04 LAB
% IRON SATURATION: 30 % (ref 15–50)
ADD MANUAL DIFF: NO
ALBUMIN SERPL-MCNC: 2.3 G/DL (ref 3.4–5)
ALBUMIN/GLOB SERPL: 0.6 {RATIO} (ref 1–2.7)
ALP SERPL-CCNC: 67 U/L (ref 46–116)
ALT SERPL-CCNC: 34 U/L (ref 12–78)
AMMONIA PLAS-SCNC: 118 UMOL/L (ref 11–32)
ANION GAP SERPL CALC-SCNC: 9 MMOL/L (ref 5–15)
AST SERPL-CCNC: 101 U/L (ref 15–37)
BASOPHILS NFR BLD AUTO: 0.8 % (ref 0–2)
BILIRUB SERPL-MCNC: 0.8 MG/DL (ref 0.2–1)
BUN SERPL-MCNC: 26 MG/DL (ref 7–18)
CALCIUM SERPL-MCNC: 7.7 MG/DL (ref 8.5–10.1)
CHLORIDE SERPL-SCNC: 113 MMOL/L (ref 98–107)
CHOLEST SERPL-MCNC: 97 MG/DL (ref ?–200)
CO2 SERPL-SCNC: 21 MMOL/L (ref 21–32)
CREAT SERPL-MCNC: 1.1 MG/DL (ref 0.55–1.3)
EOSINOPHIL NFR BLD AUTO: 4 % (ref 0–3)
ERYTHROCYTE [DISTWIDTH] IN BLOOD BY AUTOMATED COUNT: 16.8 % (ref 11.6–14.8)
FERRITIN SERPL-MCNC: 268 NG/ML (ref 8–388)
GAMMA GLUTAMYL TRANSPEPTIDASE: 35 U/L (ref 5–85)
GLOBULIN SER-MCNC: 3.8 G/DL
HCT VFR BLD CALC: 26.1 % (ref 42–52)
HDLC SERPL-MCNC: 43 MG/DL (ref 40–60)
HGB BLD-MCNC: 9 G/DL (ref 14.2–18)
IRON SERPL-MCNC: 68 UG/DL (ref 50–175)
LYMPHOCYTES NFR BLD AUTO: 19 % (ref 20–45)
MCV RBC AUTO: 90 FL (ref 80–99)
MONOCYTES NFR BLD AUTO: 11.3 % (ref 1–10)
NEUTROPHILS NFR BLD AUTO: 65.1 % (ref 45–75)
PHOSPHATE SERPL-MCNC: 3 MG/DL (ref 2.5–4.9)
PLATELET # BLD: 141 K/UL (ref 150–450)
POTASSIUM SERPL-SCNC: 4 MMOL/L (ref 3.5–5.1)
RBC # BLD AUTO: 2.9 M/UL (ref 4.7–6.1)
SODIUM SERPL-SCNC: 142 MMOL/L (ref 136–145)
TIBC SERPL-MCNC: 224 UG/DL (ref 250–450)
TRIGL SERPL-MCNC: 55 MG/DL (ref 30–150)
UNSATURATED IRON BINDING: 156 UG/DL (ref 112–346)
WBC # BLD AUTO: 8.4 K/UL (ref 4.8–10.8)

## 2020-12-04 RX ADMIN — Medication SCH MLS/HR: at 16:30

## 2020-12-04 RX ADMIN — HUMAN INSULIN SCH MLS/HR: 100 INJECTION, SOLUTION SUBCUTANEOUS at 15:30

## 2020-12-04 RX ADMIN — SODIUM CHLORIDE SCH MLS/HR: 9 INJECTION, SOLUTION INTRAVENOUS at 14:00

## 2020-12-04 RX ADMIN — CHLORHEXIDINE GLUCONATE SCH APPLIC: 213 SOLUTION TOPICAL at 20:35

## 2020-12-04 RX ADMIN — VITAMIN D, TAB 1000IU (100/BT) SCH INTLU: 25 TAB at 08:53

## 2020-12-04 RX ADMIN — SODIUM CHLORIDE SCH MLS/HR: 0.9 INJECTION INTRAVENOUS at 20:35

## 2020-12-04 RX ADMIN — PANTOPRAZOLE SODIUM SCH MG: 40 INJECTION, POWDER, FOR SOLUTION INTRAVENOUS at 08:44

## 2020-12-04 RX ADMIN — PANTOPRAZOLE SODIUM SCH MG: 40 INJECTION, POWDER, FOR SOLUTION INTRAVENOUS at 20:35

## 2020-12-04 RX ADMIN — DOCUSATE SODIUM SCH MG: 250 CAPSULE, LIQUID FILLED ORAL at 08:52

## 2020-12-04 RX ADMIN — HUMAN INSULIN SCH MLS/HR: 100 INJECTION, SOLUTION SUBCUTANEOUS at 04:13

## 2020-12-04 RX ADMIN — Medication SCH MG: at 08:53

## 2020-12-04 RX ADMIN — ASPIRIN SCH MG: 81 TABLET, DELAYED RELEASE ORAL at 08:53

## 2020-12-04 RX ADMIN — SODIUM CHLORIDE SCH MLS/HR: 0.9 INJECTION INTRAVENOUS at 11:00
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
## 2020-12-04 NOTE — DIAGNOSTIC IMAGING REPORT
EXAM:

  XR Abdomen, 1 View

 

CLINICAL HISTORY:

  NGT

 

TECHNIQUE:

  Frontal supine view of the abdomen/pelvis.

 

COMPARISON:

  No relevant prior studies available.

 

FINDINGS:

  Gastrointestinal tract:  Unremarkable.

  Bones/joints:  No acute fracture.

  Soft tissues:  Surgical clips in the abdomen.

  Vasculature:  Biliary stent.

  Tubes, lines and devices:  Enteric tube not in the stomach.  Central 

line near the atrial caval junction.

  Other findings:  12/4/20 at 1849.

 

IMPRESSION:     

  Enteric tube not in the stomach.

 

<MYCVCSECTION>

 

Communications:

 

12/04/20 19:40 Call Nurse ICU SVETLANA Reese on 12/04 19:39 (-08:00)

## 2020-12-04 NOTE — DIAGNOSTIC IMAGING REPORT
Indication: Reason For Exam: DVT

 

Technique: Grayscale and duplex images of the  bilateral lower extremity veins

 

Comparison: None

 

Findings: Exam is steadily somewhat limited, due to patient being contracted. The

right femoral vein downstream could not be imaged. Bilaterally,   grayscale and

duplex images demonstrate no evidence of intraluminal thrombus. Normal phasic Doppler

waveforms, demonstrating normal augmentation response and no evidence of valvular

insufficiency. Greater saphenous vein(s) and tibial veins are patent. Normal

compressibility. 

 

Impression: Negative for evidence of lower extremity deep venous thrombosis 

bilaterally

 

Note that exam is limited; the left downstream femoral vein could not be visualized

and therefore thrombus in this segment cannot be ruled out

## 2020-12-04 NOTE — PULMONOLOGY PROGRESS NOTE
Subjective


Interval Events:  Seen in ICU; no major changes


Constitutional:  Reports: no symptoms


HEENT:  Repors: no symptoms


Respiratory:  Reports: no symptoms


Cardiovascular:  Reports: no symptoms


Gastrointestinal/Abdominal:  Reports: no symptoms


Genitourinary:  Reports: no symptoms


Allergies:  


Coded Allergies:  


     No Known Allergies (Unverified , 8/19/18)





Objective





Last 24 Hour Vital Signs








  Date Time  Temp Pulse Resp B/P (MAP) Pulse Ox O2 Delivery O2 Flow Rate FiO2


 


12/4/20 09:00  54 13 95/59 (71) 100   


 


12/4/20 08:00      Nasal Cannula 4.0 


 


12/4/20 08:00 98.1 56 14 104/62 (76) 100   


 


12/4/20 08:00  54      


 


12/4/20 07:00  62 17 126/66 (86) 100   


 


12/4/20 06:00  62 18 131/71 (91) 100   


 


12/4/20 05:00  60 15 113/67 (82) 100   


 


12/4/20 04:30  64 16 123/76 (92) 100   


 


12/4/20 04:00      Nasal Cannula 4.0 


 


12/4/20 04:00  65      


 


12/4/20 04:00 99.4 64 15 104/82 (89) 100   


 


12/4/20 03:30  63 17 87/48 (61) 100   


 


12/4/20 03:00  59 12 96/54 (68) 100   


 


12/4/20 02:45  67 14 105/60 (75) 99   


 


12/4/20 02:30  60 13 97/54 (68) 100   


 


12/4/20 02:15  66 14 103/62 (76) 99   


 


12/4/20 02:00  62 14 98/55 (69) 100   


 


12/4/20 01:45  59 13 94/53 (67) 100   


 


12/4/20 01:30  62 15 105/59 (74) 100   


 


12/4/20 01:15  60 14 93/63 (73) 100   


 


12/4/20 01:00  60 14 99/52 (68) 100   


 


12/4/20 00:45  60 13 109/58 (75) 100   


 


12/4/20 00:30  65 12 103/54 (70) 100   


 


12/4/20 00:15  61 12 97/52 (67) 100   


 


12/4/20 00:00      Nasal Cannula 4.0 


 


12/4/20 00:00  60      


 


12/4/20 00:00  60 13 92/55 (67) 100   


 


12/3/20 23:30  59 12 104/54 (71) 100   


 


12/3/20 23:00  59 11 95/51 (66) 100   


 


12/3/20 22:00  59 13 95/53 (67) 100   


 


12/3/20 21:00  59 12 98/55 (69) 99   


 


12/3/20 20:00      Nasal Cannula 4.0 


 


12/3/20 20:00 97.1 60 14 108/70 (83) 100   


 


12/3/20 20:00  60      


 


12/3/20 19:00  49 12 94/50 (65) 100   


 


12/3/20 18:45  56 15 118/64 (82) 100   


 


12/3/20 18:30  57 14 126/58 (80) 100   


 


12/3/20 18:15  57 13 131/64 (86) 100   


 


12/3/20 18:00 97.0 56 13 123/68 (86) 100   


 


12/3/20 17:47      Nasal Cannula 4.0 


 


12/3/20 17:45  50 11 111/63 (79) 100   


 


12/3/20 17:35    120/54    


 


12/3/20 17:30  50 11 120/54 (76) 100   


 


12/3/20 17:15  58 17 112/49 (70) 100   


 


12/3/20 17:06    127/62 (83)    


 


12/3/20 16:50 98.9 99 20 109/76 99 Nasal Cannula 4.0 


 


12/3/20 16:30    112/70    


 


12/3/20 16:15    114/71    


 


12/3/20 16:10 100.1 79 21 122/51 98 Nasal Cannula 4.0 


 


12/3/20 16:10 100.1 60 17 116/43 100 Nasal Cannula 4.0 


 


12/3/20 16:00    116/76    


 


12/3/20 15:40    125/70    


 


12/3/20 15:40 100.1 69 19 110/76 99 Nasal Cannula 4.0 


 


12/3/20 15:25    110/62    


 


12/3/20 15:20    89/43    


 


12/3/20 14:20 100.1 64 15 78/42 100 Nasal Cannula 2.0 


 


12/3/20 13:50  87 18   Nasal Cannula 4.0 98

















Intake and Output  


 


 12/3/20 12/4/20





 19:00 07:00


 


Intake Total 150.625 ml 1483.3333 ml


 


Output Total 60 ml 590 ml


 


Balance 90.625 ml 893.3333 ml


 


  


 


Intake Oral 0 ml 


 


IV Total 150.625 ml 1483.3333 ml


 


Output Urine Total 60 ml 590 ml








General Appearance:  no acute distress


Respiratory:  chest wall non-tender


Cardiovascular:  normal peripheral pulses


Abdomen:  normal bowel sounds


Extremities:  no cyanosis





Microbiology








 Date/Time


Source Procedure


Growth Status





 


 12/3/20 16:00


Rectum  Received





 12/3/20 14:05


Nasal Nares - Final Complete


 


 12/3/20 14:05


Nasal Nares - Final Complete








Laboratory Tests


12/3/20 13:35: 


White Blood Count 11.5H, Red Blood Count 3.10L, Hemoglobin 9.5L, Hematocrit 

27.9L, Mean Corpuscular Volume 90, Mean Corpuscular Hemoglobin 30.8, Mean 

Corpuscular Hemoglobin Concent 34.2, Red Cell Distribution Width 16.7H, Platelet

Count 161, Mean Platelet Volume 6.1L, Neutrophils (%) (Auto) 65.1, Lymphocytes 

(%) (Auto) 20.6, Monocytes (%) (Auto) 11.5H, Eosinophils (%) (Auto) 2.0, 

Basophils (%) (Auto) 0.9, Prothrombin Time 12.3H, Prothromb Time International 

Ratio 1.1, Activated Partial Thromboplast Time 31, Sodium Level 139, Potassium 

Level 4.3, Chloride Level 107, Carbon Dioxide Level 22, Anion Gap 10, Blood Urea

Nitrogen 35H, Creatinine 1.5H, Estimat Glomerular Filtration Rate 46.0, Glucose 

Level 102, Lactic Acid Level 2.80H, Calcium Level 8.4L, Phosphorus Level 3.2, 

Magnesium Level 2.0, Total Bilirubin 0.7, Aspartate Amino Transf (AST/SGOT) 82H,

Alanine Aminotransferase (ALT/SGPT) 20, Alkaline Phosphatase 74, Total Creatine 

Kinase 2698H, Creatine Kinase MB 36.0H, Creatine Kinase MB Relative Index 1.3, 

Troponin I 0.038, Total Protein 6.5, Albumin 2.5L, Globulin 4.0, Albumin/

Globulin Ratio 0.6L


12/3/20 14:39: 


Arterial Blood pH 7.509H, Arterial Blood Partial Pressure CO2 21.4*L, Arterial 

Blood Partial Pressure O2 172.9H, Arterial Blood HCO3 16.7*L, Arterial Blood 

Oxygen Saturation 98.7, Arterial Blood Base Excess -0.5, Florentin Test Positive


12/3/20 15:00: 


Lactic Acid Level 2.90H, Urine Color Yellow, Urine Appearance Clear, Urine pH 6,

Urine Specific Gravity 1.010, Urine Protein 1+H, Urine Glucose (UA) Negative, 

Urine Ketones 1+H, Urine Blood 1+H, Urine Nitrite Negative, Urine Bilirubin 

Negative, Urine Urobilinogen 4H, Urine Leukocyte Esterase Negative, Urine RBC 2-

4H, Urine WBC 0-2, Urine Squamous Epithelial Cells Occasional, Urine Bacteria 

None


12/3/20 21:15: Lactic Acid Level 2.30H


12/3/20 23:20: Lactic Acid Level 2.50H


12/4/20 04:30: 


Lactic Acid Level 2.00, White Blood Count 8.4, Red Blood Count 2.90L, Hemoglobin

9.0L, Hematocrit 26.1L, Mean Corpuscular Volume 90, Mean Corpuscular Hemoglobin 

30.9, Mean Corpuscular Hemoglobin Concent 34.3, Red Cell Distribution Width 

16.8H, Platelet Count 141L, Mean Platelet Volume 6.4L, Neutrophils (%) (Auto) 

65.1, Lymphocytes (%) (Auto) 19.0L, Monocytes (%) (Auto) 11.3H, Eosinophils (%) 

(Auto) 4.0H, Basophils (%) (Auto) 0.8, Sodium Level 142, Potassium Level 4.0, 

Chloride Level 113H, Carbon Dioxide Level 21, Anion Gap 9, Blood Urea Nitrogen 

26H, Creatinine 1.1, Estimat Glomerular Filtration Rate > 60, Glucose Level 105,

Hemoglobin A1c 5.4, Uric Acid 5.4, Calcium Level 7.7L, Phosphorus Level 3.0, 

Magnesium Level 1.9, Iron Level 68, Total Iron Binding Capacity 224L, Percent 

Iron Saturation 30, Unsaturated Iron Binding 156, Ferritin 268, Total Bilirubin 

0.8, Gamma Glutamyl Transpeptidase 35, Aspartate Amino Transf (AST/SGOT) 101H, 

Alanine Aminotransferase (ALT/SGPT) 34, Alkaline Phosphatase 67, Ammonia 118H, 

Troponin I 0.027, C-Reactive Protein, Quantitative 1.7H, Pro-B-Type Natriuretic 

Peptide 147H, Total Protein 6.1L, Albumin 2.3L, Globulin 3.8, Albumin/Globulin 

Ratio 0.6L, Triglycerides Level 55, Cholesterol Level 97, LDL Cholesterol 44, 

HDL Cholesterol 43, Cholesterol/HDL Ratio 2.3L, Lipase 115, Vitamin B12 Level 

621, Folate 15.2, Thyroid Stimulating Hormone (TSH) 1.673, Free Thyroxine 1.02, 

Cortisol AM Sample [Pending]





Current Medications








 Medications


  (Trade)  Dose


 Ordered  Sig/Kadeem


 Route


 PRN Reason  Start Time


 Stop Time Status Last Admin


Dose Admin


 


 Acetaminophen


  (Tylenol)  650 mg  Q4H  PRN


 ORAL


 Mild Pain (Pain Scale 1-3)  12/3/20 19:30


 1/2/21 19:29   





 


 Acetaminophen


  (Tylenol)  650 mg  Q4H  PRN


 ORAL


 Temp >100.5  12/3/20 19:30


 1/2/21 19:29   





 


 Aspirin


  (Ecotrin)  81 mg  DAILY


 ORAL


   12/4/20 09:00


 1/18/21 08:59   





 


 Barium Sulfate


  (Varibar Honey)  250 ml  NOW  PRN


 MC


 RAD  12/3/20 20:15


 12/6/20 20:07   





 


 Barium Sulfate


  (Varibar Nectar)  240 ml  NOW  PRN


 MC


 RAD  12/3/20 20:15


 12/6/20 20:07   





 


 Barium Sulfate


  (Varibar Pudding)  230 ml  NOW  PRN


 [facility]


 RAD  12/3/20 20:15


 12/6/20 20:07   





 


 Barium Sulfate


  (Varibar Thin


 Liquid powder)  148 gm  NOW  PRN


 MC


 RAD  12/3/20 20:15


 12/6/20 20:07   





 


 Cefepime HCl 1 gm/


 Dextrose  55 ml @ 


 110 mls/hr  EVERY 12  HOURS


 IVPB


   12/4/20 10:30


 12/11/20 10:29   





 


 Chlorhexidine


 Gluconate


  (Diane-Hex 2%)  1 applic  DAILY@2000


 TOPIC


   12/3/20 20:00


 3/3/21 19:59  12/3/20 20:01





 


 Dextrose/Sodium


 Chloride  1,000 ml @ 


 100 mls/hr  Q10H


 IV


   12/3/20 19:30


 1/2/21 19:29  12/4/20 04:13





 


 Docusate Sodium


  (Colace)  250 mg  DAILY


 ORAL


   12/4/20 09:00


 1/3/21 08:59   





 


 Norepinephrine


 Bitartrate  246 ml @ 0


 mls/hr  Q24H


 IV


   12/3/20 16:30


 12/6/20 16:29  12/3/20 17:35





 


 Pantoprazole


  (Protonix)  40 mg  EVERY 12  HOURS


 IVP


   12/3/20 21:00


 1/2/21 20:59  12/4/20 08:44





 


 Sennosides


  (Senokot)  8.6 mg  DAILY


 ORAL


   12/4/20 09:00


 1/3/21 08:59   





 


 Vancomycin HCl


  ([facility] pharmacy


 to dose)  1 ea  DAILY  PRN


 MISC


 Per rx protocol  12/4/20 10:00


 1/3/21 09:59 UNV  





 


 Vitamin D


  (Vitamin D)  1,000 intlu  DAILY


 ORAL


   12/4/20 09:00


 1/3/21 08:59   














Assessment/Plan


Assessment/Plan


IMPRESSION:


1. Shock, likely septic. On pressors


2. Fever.


3. History of CVA.


4. CHF.


5. History of hep C.





DISCUSSION: 





Continue broad-spectrum antibiotics.  IV fluids.  Pressors prn.


DVT and GI prophylaxes.  I will follow carefully.


Currently saturating well on low-flow oxygen.














  ______________________________________________


  ANA Avalos Omar Syed MD            Dec 4, 2020 10:06

## 2020-12-04 NOTE — NEPHROLOGY PROGRESS NOTE
Assessment/Plan


Problem List:  


(1) MONICA (acute kidney injury)


(2) Septic shock


(3) Dehydration


(4) Electrolyte imbalance


(5) Encephalopathy


Assessment


Acute renal failure


Dehydration


Anemia


Sepsis, shock


UTI


Toxic metabolic encephalopathy


Hypoalbuminemia, malnutrition with BMI of 17.3


Elevated CPK, rhabdo


Plan





December 4: Patient seen in ICU.  Discussed with RN.  Labs reviewed.  Stable 

from renal standpoint of view.  Ammonia is elevated.  Continue per consultants.


December 3: 


N.p.o.


Hydrate


Pressors


Ndiaye


Accurate intake and output


Avoid nephrotoxic's


Monitor renal parameters


Per orders


Per consultants





Subjective


ROS Limited/Unobtainable:  No


Constitutional:  Reports: malaise, weakness





Objective


Objective





Last 24 Hour Vital Signs








  Date Time  Temp Pulse Resp B/P (MAP) Pulse Ox O2 Delivery O2 Flow Rate FiO2


 


12/4/20 09:00  54 13 95/59 (71) 100   


 


12/4/20 08:00      Nasal Cannula 4.0 


 


12/4/20 08:00 98.1 56 14 104/62 (76) 100   


 


12/4/20 08:00  54      


 


12/4/20 07:00  62 17 126/66 (86) 100   


 


12/4/20 06:00  62 18 131/71 (91) 100   


 


12/4/20 05:00  60 15 113/67 (82) 100   


 


12/4/20 04:30  64 16 123/76 (92) 100   


 


12/4/20 04:00      Nasal Cannula 4.0 


 


12/4/20 04:00  65      


 


12/4/20 04:00 99.4 64 15 104/82 (89) 100   


 


12/4/20 03:30  63 17 87/48 (61) 100   


 


12/4/20 03:00  59 12 96/54 (68) 100   


 


12/4/20 02:45  67 14 105/60 (75) 99   


 


12/4/20 02:30  60 13 97/54 (68) 100   


 


12/4/20 02:15  66 14 103/62 (76) 99   


 


12/4/20 02:00  62 14 98/55 (69) 100   


 


12/4/20 01:45  59 13 94/53 (67) 100   


 


12/4/20 01:30  62 15 105/59 (74) 100   


 


12/4/20 01:15  60 14 93/63 (73) 100   


 


12/4/20 01:00  60 14 99/52 (68) 100   


 


12/4/20 00:45  60 13 109/58 (75) 100   


 


12/4/20 00:30  65 12 103/54 (70) 100   


 


12/4/20 00:15  61 12 97/52 (67) 100   


 


12/4/20 00:00      Nasal Cannula 4.0 


 


12/4/20 00:00  60      


 


12/4/20 00:00  60 13 92/55 (67) 100   


 


12/3/20 23:30  59 12 104/54 (71) 100   


 


12/3/20 23:00  59 11 95/51 (66) 100   


 


12/3/20 22:00  59 13 95/53 (67) 100   


 


12/3/20 21:00  59 12 98/55 (69) 99   


 


12/3/20 20:00      Nasal Cannula 4.0 


 


12/3/20 20:00 97.1 60 14 108/70 (83) 100   


 


12/3/20 20:00  60      


 


12/3/20 19:00  49 12 94/50 (65) 100   


 


12/3/20 18:45  56 15 118/64 (82) 100   


 


12/3/20 18:30  57 14 126/58 (80) 100   


 


12/3/20 18:15  57 13 131/64 (86) 100   


 


12/3/20 18:00 97.0 56 13 123/68 (86) 100   


 


12/3/20 17:47      Nasal Cannula 4.0 


 


12/3/20 17:45  50 11 111/63 (79) 100   


 


12/3/20 17:35    120/54    


 


12/3/20 17:30  50 11 120/54 (76) 100   


 


12/3/20 17:15  58 17 112/49 (70) 100   


 


12/3/20 17:06    127/62 (83)    


 


12/3/20 16:50 98.9 99 20 109/76 99 Nasal Cannula 4.0 


 


12/3/20 16:30    112/70    


 


12/3/20 16:15    114/71    


 


12/3/20 16:10 100.1 79 21 122/51 98 Nasal Cannula 4.0 


 


12/3/20 16:10 100.1 60 17 116/43 100 Nasal Cannula 4.0 


 


12/3/20 16:00    116/76    


 


12/3/20 15:40    125/70    


 


12/3/20 15:40 100.1 69 19 110/76 99 Nasal Cannula 4.0 


 


12/3/20 15:25    110/62    


 


12/3/20 15:20    89/43    


 


12/3/20 14:20 100.1 64 15 78/42 100 Nasal Cannula 2.0 


 


12/3/20 13:50  87 18   Nasal Cannula 4.0 98

















Intake and Output  


 


 12/3/20 12/4/20





 18:59 06:59


 


Intake Total 43.125 ml 1490.8333 ml


 


Output Total 60 ml 560 ml


 


Balance -16.875 ml 930.8333 ml


 


  


 


Intake Oral 0 ml 


 


IV Total 43.125 ml 1490.8333 ml


 


Output Urine Total 60 ml 560 ml











Current Medications








 Medications


  (Trade)  Dose


 Ordered  Sig/Kadeem


 Route


 PRN Reason  Start Time


 Stop Time Status Last Admin


Dose Admin


 


 Acetaminophen


  (Tylenol)  650 mg  Q4H  PRN


 ORAL


 Mild Pain (Pain Scale 1-3)  12/3/20 19:30


 1/2/21 19:29   





 


 Acetaminophen


  (Tylenol)  650 mg  Q4H  PRN


 ORAL


 Temp >100.5  12/3/20 19:30


 1/2/21 19:29   





 


 Aspirin


  (Ecotrin)  81 mg  DAILY


 ORAL


   12/4/20 09:00


 1/18/21 08:59   





 


 Barium Sulfate


  (Varibar Honey)  250 ml  NOW  PRN


 MC


 RAD  12/3/20 20:15


 12/6/20 20:07   





 


 Barium Sulfate


  (Varibar Nectar)  240 ml  NOW  PRN


 MC


 RAD  12/3/20 20:15


 12/6/20 20:07   





 


 Barium Sulfate


  (Varibar Pudding)  230 ml  NOW  PRN


 MC


 RAD  12/3/20 20:15


 12/6/20 20:07   





 


 Barium Sulfate


  (Varibar Thin


 Liquid powder)  148 gm  NOW  PRN


 MC


 RAD  12/3/20 20:15


 12/6/20 20:07   





 


 Cefepime HCl 1 gm/


 Dextrose  55 ml @ 


 110 mls/hr  EVERY 12  HOURS


 IVPB


   12/4/20 10:30


 12/11/20 10:29   





 


 Chlorhexidine


 Gluconate


  (Diane-Hex 2%)  1 applic  DAILY@2000


 TOPIC


   12/3/20 20:00


 3/3/21 19:59  12/3/20 20:01





 


 Dextrose/Sodium


 Chloride  1,000 ml @ 


 100 mls/hr  Q10H


 IV


   12/3/20 19:30


 1/2/21 19:29  12/4/20 04:13





 


 Docusate Sodium


  (Colace)  250 mg  DAILY


 ORAL


   12/4/20 09:00


 1/3/21 08:59   





 


 Norepinephrine


 Bitartrate  246 ml @ 0


 mls/hr  Q24H


 IV


   12/3/20 16:30


 12/6/20 16:29  12/3/20 17:35





 


 Pantoprazole


  (Protonix)  40 mg  EVERY 12  HOURS


 IVP


   12/3/20 21:00


 1/2/21 20:59  12/4/20 08:44





 


 Sennosides


  (Senokot)  8.6 mg  DAILY


 ORAL


   12/4/20 09:00


 1/3/21 08:59   





 


 Vancomycin HCl


  ([facility] pharmacy


 to dose)  1 ea  DAILY  PRN


 MISC


 Per rx protocol  12/4/20 10:00


 1/3/21 09:59   





 


 Vancomycin HCl 1


 gm/Dextrose  275 ml @ 


 183.708


 mls/hr  Q24H


 IVPB


   12/4/20 14:00


 12/9/20 13:59   





 


 Vitamin D


  (Vitamin D)  1,000 intlu  DAILY


 ORAL


   12/4/20 09:00


 1/3/21 08:59   








Laboratory Tests


12/3/20 13:35: 


White Blood Count 11.5H, Red Blood Count 3.10L, Hemoglobin 9.5L, Hematocrit 

27.9L, Mean Corpuscular Volume 90, Mean Corpuscular Hemoglobin 30.8, Mean 

Corpuscular Hemoglobin Concent 34.2, Red Cell Distribution Width 16.7H, Platelet

Count 161, Mean Platelet Volume 6.1L, Neutrophils (%) (Auto) 65.1, Lymphocytes 

(%) (Auto) 20.6, Monocytes (%) (Auto) 11.5H, Eosinophils (%) (Auto) 2.0, 

Basophils (%) (Auto) 0.9, Prothrombin Time 12.3H, Prothromb Time International 

Ratio 1.1, Activated Partial Thromboplast Time 31, Sodium Level 139, Potassium L

evel 4.3, Chloride Level 107, Carbon Dioxide Level 22, Anion Gap 10, Blood Urea 

Nitrogen 35H, Creatinine 1.5H, Estimat Glomerular Filtration Rate 46.0, Glucose 

Level 102, Lactic Acid Level 2.80H, Calcium Level 8.4L, Phosphorus Level 3.2, 

Magnesium Level 2.0, Total Bilirubin 0.7, Aspartate Amino Transf (AST/SGOT) 82H,

Alanine Aminotransferase (ALT/SGPT) 20, Alkaline Phosphatase 74, Total Creatine 

Kinase 2698H, Creatine Kinase MB 36.0H, Creatine Kinase MB Relative Index 1.3, 

Troponin I 0.038, Total Protein 6.5, Albumin 2.5L, Globulin 4.0, 

Albumin/Globulin Ratio 0.6L


12/3/20 14:39: 


Arterial Blood pH 7.509H, Arterial Blood Partial Pressure CO2 21.4*L, Arterial 

Blood Partial Pressure O2 172.9H, Arterial Blood HCO3 16.7*L, Arterial Blood 

Oxygen Saturation 98.7, Arterial Blood Base Excess -0.5, Florentin Test Positive


12/3/20 15:00: 


Lactic Acid Level 2.90H, Urine Color Yellow, Urine Appearance Clear, Urine pH 6,

Urine Specific Gravity 1.010, Urine Protein 1+H, Urine Glucose (UA) Negative, 

Urine Ketones 1+H, Urine Blood 1+H, Urine Nitrite Negative, Urine Bilirubin N

egative, Urine Urobilinogen 4H, Urine Leukocyte Esterase Negative, Urine RBC 2-

4H, Urine WBC 0-2, Urine Squamous Epithelial Cells Occasional, Urine Bacteria 

None


12/3/20 21:15: Lactic Acid Level 2.30H


12/3/20 23:20: Lactic Acid Level 2.50H


12/4/20 04:30: 


Lactic Acid Level 2.00, White Blood Count 8.4, Red Blood Count 2.90L, Hemoglobin

9.0L, Hematocrit 26.1L, Mean Corpuscular Volume 90, Mean Corpuscular Hemoglobin 

30.9, Mean Corpuscular Hemoglobin Concent 34.3, Red Cell Distribution Width 

16.8H, Platelet Count 141L, Mean Platelet Volume 6.4L, Neutrophils (%) (Auto) 

65.1, Lymphocytes (%) (Auto) 19.0L, Monocytes (%) (Auto) 11.3H, Eosinophils (%) 

(Auto) 4.0H, Basophils (%) (Auto) 0.8, Sodium Level 142, Potassium Level 4.0, 

Chloride Level 113H, Carbon Dioxide Level 21, Anion Gap 9, Blood Urea Nitrogen 

26H, Creatinine 1.1, Estimat Glomerular Filtration Rate > 60, Glucose Level 105,

Hemoglobin A1c 5.4, Uric Acid 5.4, Calcium Level 7.7L, Phosphorus Level 3.0, 

Magnesium Level 1.9, Iron Level 68, Total Iron Binding Capacity 224L, Percent 

Iron Saturation 30, Unsaturated Iron Binding 156, Ferritin 268, Total Bilirubin 

0.8, Gamma Glutamyl Transpeptidase 35, Aspartate Amino Transf (AST/SGOT) 101H, 

Alanine Aminotransferase (ALT/SGPT) 34, Alkaline Phosphatase 67, Ammonia 118H, 

Troponin I 0.027, C-Reactive Protein, Quantitative 1.7H, Pro-B-Type Natriuretic 

Peptide 147H, Total Protein 6.1L, Albumin 2.3L, Globulin 3.8, Albumin/Globulin 

Ratio 0.6L, Triglycerides Level 55, Cholesterol Level 97, LDL Cholesterol 44, 

HDL Cholesterol 43, Cholesterol/HDL Ratio 2.3L, Lipase 115, Vitamin B12 Level 

621, Folate 15.2, Thyroid Stimulating Hormone (TSH) 1.673, Free Thyroxine 1.02, 

Cortisol AM Sample [Pending]


12/4/20 09:53: 


Arterial Blood pH 7.503H, Arterial Blood Partial Pressure CO2 23.7*L, Arterial 

Blood Partial Pressure O2 142.5H, Arterial Blood HCO3 18.2L, Arterial Blood 

Oxygen Saturation 98.7, Arterial Blood Base Excess -3.9L, Florentin Test Positive


Height (Feet):  5


Height (Inches):  8.00


Weight (Pounds):  114


General Appearance:  no apparent distress, lethargic


Cardiovascular:  normal rate


Respiratory/Chest:  decreased breath sounds


Abdomen:  distended











Alvin Alegria MD             Dec 4, 2020 11:05

## 2020-12-04 NOTE — CARDIAC ELECTROPHYSIOLOGY PN
Assessment/Plan


Assessment/Plan


1. S/P septic shock in this patient with lactic acid of 2.9 and white count of 

11.5.  


    Ruled out for COVID. Influenza is negative. On IV fluids  Ruled out for 

myocardial infarction.  


    Echocardiogram EF 65%.





2. Fever and cough and respiratory failure.  Patient will be started on


IV antibiotic per ID. 





3. Anemia.  Hemoglobin 9.5.


4. Renal failure with BUN of 35, creatinine of 1.5.  





5. Rhabdomyolysis with CPK of 2700.


6. History of congestive heart failure, but recent echo about 3 months


ago showed normal left ventricular systolic function.


7. 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


8. Complete right bundle-branch block and history of bradycardia with


heart rate in the 50s.  Off any sinus or AV jonathan blocking agents.  We


will watch the patient on telemetry.





Subjective


Subjective


In ICU being Ruled out for Covid





Objective





Last 24 Hour Vital Signs








  Date Time  Temp Pulse Resp B/P (MAP) Pulse Ox O2 Delivery O2 Flow Rate FiO2


 


12/4/20 13:00 97.3 58 13 93/56 (68) 100   


 


12/4/20 12:00      Nasal Cannula 4.0 


 


12/4/20 12:00  63      


 


12/4/20 12:00  75 15 96/61 (73) 100   


 


12/4/20 11:00  94 27 120/54 (76) 100   


 


12/4/20 09:00  54 13 95/59 (71) 100   


 


12/4/20 08:00      Nasal Cannula 4.0 


 


12/4/20 08:00 98.1 56 14 104/62 (76) 100   


 


12/4/20 08:00  54      


 


12/4/20 08:00 98.1 87 29 104/62 (76) 100   


 


12/4/20 07:00  62 17 126/66 (86) 100   


 


12/4/20 06:00  62 18 131/71 (91) 100   


 


12/4/20 05:00  60 15 113/67 (82) 100   


 


12/4/20 04:30  64 16 123/76 (92) 100   


 


12/4/20 04:00      Nasal Cannula 4.0 


 


12/4/20 04:00  65      


 


12/4/20 04:00 99.4 64 15 104/82 (89) 100   


 


12/4/20 03:30  63 17 87/48 (61) 100   


 


12/4/20 03:00  59 12 96/54 (68) 100   


 


12/4/20 02:45  67 14 105/60 (75) 99   


 


12/4/20 02:30  60 13 97/54 (68) 100   


 


12/4/20 02:15  66 14 103/62 (76) 99   


 


12/4/20 02:00  62 14 98/55 (69) 100   


 


12/4/20 01:45  59 13 94/53 (67) 100   


 


12/4/20 01:30  62 15 105/59 (74) 100   


 


12/4/20 01:15  60 14 93/63 (73) 100   


 


12/4/20 01:00  60 14 99/52 (68) 100   


 


12/4/20 00:45  60 13 109/58 (75) 100   


 


12/4/20 00:30  65 12 103/54 (70) 100   


 


12/4/20 00:15  61 12 97/52 (67) 100   


 


12/4/20 00:00      Nasal Cannula 4.0 


 


12/4/20 00:00  60      


 


12/4/20 00:00  60 13 92/55 (67) 100   


 


12/3/20 23:30  59 12 104/54 (71) 100   


 


12/3/20 23:00  59 11 95/51 (66) 100   


 


12/3/20 22:00  59 13 95/53 (67) 100   


 


12/3/20 21:00  59 12 98/55 (69) 99   


 


12/3/20 20:00      Nasal Cannula 4.0 


 


12/3/20 20:00 97.1 60 14 108/70 (83) 100   


 


12/3/20 20:00  60      


 


12/3/20 19:00  49 12 94/50 (65) 100   


 


12/3/20 18:45  56 15 118/64 (82) 100   


 


12/3/20 18:30  57 14 126/58 (80) 100   


 


12/3/20 18:15  57 13 131/64 (86) 100   


 


12/3/20 18:00 97.0 56 13 123/68 (86) 100   


 


12/3/20 17:47      Nasal Cannula 4.0 


 


12/3/20 17:45  50 11 111/63 (79) 100   


 


12/3/20 17:35    120/54    


 


12/3/20 17:30  50 11 120/54 (76) 100   


 


12/3/20 17:15  58 17 112/49 (70) 100   


 


12/3/20 17:06    127/62 (83)    


 


12/3/20 16:50 98.9 99 20 109/76 99 Nasal Cannula 4.0 


 


12/3/20 16:30    112/70    


 


12/3/20 16:15    114/71    


 


12/3/20 16:10 100.1 79 21 122/51 98 Nasal Cannula 4.0 


 


12/3/20 16:10 100.1 60 17 116/43 100 Nasal Cannula 4.0 


 


12/3/20 16:00    116/76    


 


12/3/20 15:40    125/70    


 


12/3/20 15:40 100.1 69 19 110/76 99 Nasal Cannula 4.0 


 


12/3/20 15:25    110/62    


 


12/3/20 15:20    89/43    

















Intake and Output  


 


 12/3/20 12/4/20





 19:00 07:00


 


Intake Total 150.625 ml 1483.3333 ml


 


Output Total 60 ml 590 ml


 


Balance 90.625 ml 893.3333 ml


 


  


 


Intake Oral 0 ml 


 


IV Total 150.625 ml 1483.3333 ml


 


Output Urine Total 60 ml 590 ml











Laboratory Tests








Test


 12/3/20


14:39 12/3/20


15:00 12/3/20


21:15 12/3/20


23:20


 


Arterial Blood pH


 7.509


(7.350-7.450) 


 


 





 


Arterial Blood Partial


Pressure CO2 21.4 mmHg


(35.0-45.0)  *L 


 


 





 


Arterial Blood Partial


Pressure O2 172.9 mmHg


(75.0-100.0)  H 


 


 





 


Arterial Blood HCO3


 16.7 mmol/L


(22.0-26.0)  *L 


 


 





 


Arterial Blood Oxygen


Saturation 98.7 %


() 


 


 





 


Arterial Blood Base Excess -0.5 (-2-2)     


 


Florentin Test Positive     


 


Urine Color  Yellow    


 


Urine Appearance  Clear    


 


Urine pH  6 (4.5-8.0)    


 


Urine Specific Gravity


 


 1.010


(1.005-1.035) 


 





 


Urine Protein


 


 1+ (NEGATIVE)


H 


 





 


Urine Glucose (UA)


 


 Negative


(NEGATIVE) 


 





 


Urine Ketones


 


 1+ (NEGATIVE)


H 


 





 


Urine Blood


 


 1+ (NEGATIVE)


H 


 





 


Urine Nitrite


 


 Negative


(NEGATIVE) 


 





 


Urine Bilirubin


 


 Negative


(NEGATIVE) 


 





 


Urine Urobilinogen


 


 4 MG/DL


(0.0-1.0)  H 


 





 


Urine Leukocyte Esterase


 


 Negative


(NEGATIVE) 


 





 


Urine RBC


 


 2-4 /HPF (0 -


0)  H 


 





 


Urine WBC


 


 0-2 /HPF (0 -


0) 


 





 


Urine Squamous Epithelial


Cells 


 Occasional


/LPF 


 





 


Urine Bacteria


 


 None /HPF


(NONE) 


 





 


Lactic Acid Level


 


 2.90 mmol/L


(0.66-2.22)  H 2.30 mmol/L


(0.4-2.0)  H 2.50 mmol/L


(0.66-2.22)  H


 


Test


 12/4/20


04:30 12/4/20


09:53 


 





 


White Blood Count


 8.4 K/UL


(4.8-10.8) 


 


 





 


Red Blood Count


 2.90 M/UL


(4.70-6.10)  L 


 


 





 


Hemoglobin


 9.0 G/DL


(14.2-18.0)  L 


 


 





 


Hematocrit


 26.1 %


(42.0-52.0)  L 


 


 





 


Mean Corpuscular Volume 90 FL (80-99)     


 


Mean Corpuscular Hemoglobin


 30.9 PG


(27.0-31.0) 


 


 





 


Mean Corpuscular Hemoglobin


Concent 34.3 G/DL


(32.0-36.0) 


 


 





 


Red Cell Distribution Width


 16.8 %


(11.6-14.8)  H 


 


 





 


Platelet Count


 141 K/UL


(150-450)  L 


 


 





 


Mean Platelet Volume


 6.4 FL


(6.5-10.1)  L 


 


 





 


Neutrophils (%) (Auto)


 65.1 %


(45.0-75.0) 


 


 





 


Lymphocytes (%) (Auto)


 19.0 %


(20.0-45.0)  L 


 


 





 


Monocytes (%) (Auto)


 11.3 %


(1.0-10.0)  H 


 


 





 


Eosinophils (%) (Auto)


 4.0 %


(0.0-3.0)  H 


 


 





 


Basophils (%) (Auto)


 0.8 %


(0.0-2.0) 


 


 





 


Sodium Level


 142 MMOL/L


(136-145) 


 


 





 


Potassium Level


 4.0 MMOL/L


(3.5-5.1) 


 


 





 


Chloride Level


 113 MMOL/L


()  H 


 


 





 


Carbon Dioxide Level


 21 MMOL/L


(21-32) 


 


 





 


Anion Gap


 9 mmol/L


(5-15) 


 


 





 


Blood Urea Nitrogen


 26 mg/dL


(7-18)  H 


 


 





 


Creatinine


 1.1 MG/DL


(0.55-1.30) 


 


 





 


Estimat Glomerular Filtration


Rate > 60 mL/min


(>60) 


 


 





 


Glucose Level


 105 MG/DL


() 


 


 





 


Hemoglobin A1c


 5.4 %


(4.3-6.0) 


 


 





 


Lactic Acid Level


 2.00 mmol/L


(0.4-2.0) 


 


 





 


Uric Acid


 5.4 MG/DL


(2.6-7.2) 


 


 





 


Calcium Level


 7.7 MG/DL


(8.5-10.1)  L 


 


 





 


Phosphorus Level


 3.0 MG/DL


(2.5-4.9) 


 


 





 


Magnesium Level


 1.9 MG/DL


(1.8-2.4) 


 


 





 


Iron Level


 68 ug/dL


() 


 


 





 


Total Iron Binding Capacity


 224 ug/dL


(250-450)  L 


 


 





 


Percent Iron Saturation 30 % (15-50)     


 


Unsaturated Iron Binding


 156 ug/dL


(112-346) 


 


 





 


Ferritin


 268 NG/ML


(8-388) 


 


 





 


Total Bilirubin


 0.8 MG/DL


(0.2-1.0) 


 


 





 


Gamma Glutamyl Transpeptidase 35 U/L (5-85)     


 


Aspartate Amino Transf


(AST/SGOT) 101 U/L


(15-37)  H 


 


 





 


Alanine Aminotransferase


(ALT/SGPT) 34 U/L (12-78)


 


 


 





 


Alkaline Phosphatase


 67 U/L


() 


 


 





 


Ammonia


 118 umol/L


(11-32)  H 


 


 





 


Troponin I


 0.027 ng/mL


(0.000-0.056) 


 


 





 


C-Reactive Protein,


Quantitative 1.7 mg/dL


(0.00-0.90)  H 


 


 





 


Pro-B-Type Natriuretic Peptide


 147 pg/mL


(0-125)  H 


 


 





 


Total Protein


 6.1 G/DL


(6.4-8.2)  L 


 


 





 


Albumin


 2.3 G/DL


(3.4-5.0)  L 


 


 





 


Globulin 3.8 g/dL     


 


Albumin/Globulin Ratio


 0.6 (1.0-2.7)


L 


 


 





 


Triglycerides Level


 55 MG/DL


() 


 


 





 


Cholesterol Level


 97 MG/DL (<


200) 


 


 





 


LDL Cholesterol


 44 mg/dL


(<100) 


 


 





 


HDL Cholesterol


 43 MG/DL


(40-60) 


 


 





 


Cholesterol/HDL Ratio


 2.3 (3.3-4.4)


L 


 


 





 


Lipase


 115 U/L


() 


 


 





 


Vitamin B12 Level


 621 PG/ML


(193-986) 


 


 





 


Folate


 15.2 NG/ML


(8.6-58.9) 


 


 





 


Thyroid Stimulating Hormone


(TSH) 1.673 uiU/mL


(0.358-3.740) 


 


 





 


Free Thyroxine


 1.02 NG/DL


(0.76-1.46) 


 


 





 


Cortisol AM Sample 11.4 UG/DL     


 


Arterial Blood pH


 


 7.503


(7.350-7.450) 


 





 


Arterial Blood Partial


Pressure CO2 


 23.7 mmHg


(35.0-45.0)  *L 


 





 


Arterial Blood Partial


Pressure O2 


 142.5 mmHg


(75.0-100.0)  H 


 





 


Arterial Blood HCO3


 


 18.2 mmol/L


(22.0-26.0)  L 


 





 


Arterial Blood Oxygen


Saturation 


 98.7 %


() 


 





 


Arterial Blood Base Excess  -3.9 (-2-2)  L  


 


Florentin Test  Positive    











Microbiology








 Date/Time


Source Procedure


Growth Status





 


 12/3/20 16:00


Rectum  Received





 12/3/20 14:05


Nasal Nares - Final Complete


 


 12/3/20 14:05


Nasal Nares - Final Complete


 


 12/3/20 13:35


Blood Blood Culture - Preliminary Resulted








Objective





HEAD AND NECK:  Shows no JVD.


LUNGS:  Coarse rhonchi.


CARDIOVASCULAR:  Shows regular S1 and S2 with no gallop or murmur.


ABDOMEN:  Soft.


EXTREMITIES:  No pitting edema.











Levi Otero MD                Dec 4, 2020 14:39

## 2020-12-05 VITALS — SYSTOLIC BLOOD PRESSURE: 108 MMHG | DIASTOLIC BLOOD PRESSURE: 58 MMHG

## 2020-12-05 VITALS — SYSTOLIC BLOOD PRESSURE: 118 MMHG | DIASTOLIC BLOOD PRESSURE: 69 MMHG

## 2020-12-05 VITALS — DIASTOLIC BLOOD PRESSURE: 73 MMHG | SYSTOLIC BLOOD PRESSURE: 114 MMHG

## 2020-12-05 VITALS — SYSTOLIC BLOOD PRESSURE: 120 MMHG | DIASTOLIC BLOOD PRESSURE: 66 MMHG

## 2020-12-05 VITALS — SYSTOLIC BLOOD PRESSURE: 112 MMHG | DIASTOLIC BLOOD PRESSURE: 60 MMHG

## 2020-12-05 VITALS — SYSTOLIC BLOOD PRESSURE: 111 MMHG | DIASTOLIC BLOOD PRESSURE: 68 MMHG

## 2020-12-05 VITALS — SYSTOLIC BLOOD PRESSURE: 114 MMHG | DIASTOLIC BLOOD PRESSURE: 73 MMHG

## 2020-12-05 VITALS — DIASTOLIC BLOOD PRESSURE: 62 MMHG | SYSTOLIC BLOOD PRESSURE: 114 MMHG

## 2020-12-05 VITALS — SYSTOLIC BLOOD PRESSURE: 110 MMHG | DIASTOLIC BLOOD PRESSURE: 60 MMHG

## 2020-12-05 VITALS — DIASTOLIC BLOOD PRESSURE: 58 MMHG | SYSTOLIC BLOOD PRESSURE: 102 MMHG

## 2020-12-05 VITALS — DIASTOLIC BLOOD PRESSURE: 68 MMHG | SYSTOLIC BLOOD PRESSURE: 122 MMHG

## 2020-12-05 VITALS — DIASTOLIC BLOOD PRESSURE: 61 MMHG | SYSTOLIC BLOOD PRESSURE: 130 MMHG

## 2020-12-05 VITALS — DIASTOLIC BLOOD PRESSURE: 63 MMHG | SYSTOLIC BLOOD PRESSURE: 107 MMHG

## 2020-12-05 VITALS — SYSTOLIC BLOOD PRESSURE: 109 MMHG | DIASTOLIC BLOOD PRESSURE: 70 MMHG

## 2020-12-05 RX ADMIN — HUMAN INSULIN SCH MLS/HR: 100 INJECTION, SOLUTION SUBCUTANEOUS at 21:41

## 2020-12-05 RX ADMIN — ASPIRIN SCH MG: 81 TABLET, DELAYED RELEASE ORAL at 08:24

## 2020-12-05 RX ADMIN — SODIUM CHLORIDE SCH MLS/HR: 9 INJECTION, SOLUTION INTRAVENOUS at 13:39

## 2020-12-05 RX ADMIN — Medication SCH MG: at 08:25

## 2020-12-05 RX ADMIN — HUMAN INSULIN SCH MLS/HR: 100 INJECTION, SOLUTION SUBCUTANEOUS at 01:36

## 2020-12-05 RX ADMIN — CHLORHEXIDINE GLUCONATE SCH APPLIC: 213 SOLUTION TOPICAL at 21:40

## 2020-12-05 RX ADMIN — DOCUSATE SODIUM SCH MG: 250 CAPSULE, LIQUID FILLED ORAL at 08:23

## 2020-12-05 RX ADMIN — SODIUM CHLORIDE SCH MLS/HR: 0.9 INJECTION INTRAVENOUS at 21:40

## 2020-12-05 RX ADMIN — SODIUM CHLORIDE SCH MLS/HR: 0.9 INJECTION INTRAVENOUS at 08:23

## 2020-12-05 RX ADMIN — PANTOPRAZOLE SODIUM SCH MG: 40 INJECTION, POWDER, FOR SOLUTION INTRAVENOUS at 08:23

## 2020-12-05 RX ADMIN — HUMAN INSULIN SCH MLS/HR: 100 INJECTION, SOLUTION SUBCUTANEOUS at 11:15

## 2020-12-05 RX ADMIN — VITAMIN D, TAB 1000IU (100/BT) SCH INTLU: 25 TAB at 08:25

## 2020-12-05 RX ADMIN — PANTOPRAZOLE SODIUM SCH MG: 40 INJECTION, POWDER, FOR SOLUTION INTRAVENOUS at 21:40

## 2020-12-05 NOTE — NEPHROLOGY PROGRESS NOTE
Assessment/Plan


Problem List:  


(1) MONICA (acute kidney injury)


(2) Septic shock


(3) Dehydration


(4) Electrolyte imbalance


(5) Encephalopathy


Assessment


Acute renal failure


Dehydration


Anemia


Sepsis, shock


UTI


Toxic metabolic encephalopathy


Hypoalbuminemia, malnutrition with BMI of 17.3


Elevated CPK, rhabdo


Plan


December 5: Seen in ICU.  Discussed with RN.  No labs drawn today.  Will check 

labs tomorrow.  Continue per consultants.


December 4: Patient seen in ICU.  Discussed with RN.  Labs reviewed.  Stable 

from renal standpoint of view.  Ammonia is elevated.  Continue per consultants.


December 3: 


N.p.o.


Hydrate


Pressors


Ndiaye


Accurate intake and output


Avoid nephrotoxic's


Monitor renal parameters


Per orders


Per consultants





Subjective


ROS Limited/Unobtainable:  No


Constitutional:  Reports: malaise, weakness





Objective


Objective





Last 24 Hour Vital Signs








  Date Time  Temp Pulse Resp B/P (MAP) Pulse Ox O2 Delivery O2 Flow Rate FiO2


 


12/5/20 09:00  56 12 114/73 (87) 100   


 


12/5/20 08:00      Nasal Cannula 4.0 


 


12/5/20 08:00  56      


 


12/5/20 08:00 98.0 53 13 109/70 (83) 100   


 


12/5/20 07:00  60 14 110/60 (77) 100   


 


12/5/20 06:00  54 12 112/60 (77) 100   


 


12/5/20 05:00  56 13 120/66 (84) 100   


 


12/5/20 04:00      Nasal Cannula 4.0 


 


12/5/20 04:00 98.2 53 12 108/58 (75) 100   


 


12/5/20 04:00  52      


 


12/5/20 03:00  55 12 108/58 (75) 100   


 


12/5/20 02:00  53 16 107/63 (78) 100   


 


12/5/20 01:00  52 12 114/62 (79) 100   


 


12/5/20 00:00 98.5 55 12 102/58 (73) 100   


 


12/5/20 00:00      Nasal Cannula 4.0 


 


12/5/20 00:00  57      


 


12/4/20 23:00  57 13 102/67 (79) 100   


 


12/4/20 22:00  69 15 114/65 (81) 99   


 


12/4/20 21:00  66 15 106/64 (78) 100   


 


12/4/20 20:00  73      


 


12/4/20 20:00 98.4 66 12 103/63 (76) 99   


 


12/4/20 20:00      Nasal Cannula 4.0 


 


12/4/20 19:00  70 14 94/48 (63) 97   


 


12/4/20 18:00  61 13 96/52 (67) 100   


 


12/4/20 17:00 98.0 60 14 88/52 (64) 100   


 


12/4/20 16:30    106/52    


 


12/4/20 16:00  60      


 


12/4/20 16:00  64 13 102/57 (72) 100   


 


12/4/20 16:00      Nasal Cannula 4.0 


 


12/4/20 15:00  60 13 105/53 (70) 100   


 


12/4/20 14:00  58 13 102/59 (73) 100   


 


12/4/20 13:00 97.3 58 13 93/56 (68) 100   


 


12/4/20 12:00      Nasal Cannula 4.0 


 


12/4/20 12:00  63      


 


12/4/20 12:00  75 15 96/61 (73) 100   


 


12/4/20 11:00  94 27 120/54 (76) 100   

















Intake and Output  


 


 12/4/20 12/5/20





 19:00 07:00


 


Intake Total 700 ml 1100 ml


 


Output Total 440 ml 410 ml


 


Balance 260 ml 690 ml


 


  


 


IV Total 700 ml 1100 ml


 


Output Urine Total 440 ml 410 ml








No labs drawn today


Height (Feet):  5


Height (Inches):  8.00


Weight (Pounds):  114


General Appearance:  no apparent distress


EENT:  other - On nasal cannula


Cardiovascular:  bradycardia


Respiratory/Chest:  decreased breath sounds


Abdomen:  distended











Alvin Alegria MD             Dec 5, 2020 10:06

## 2020-12-05 NOTE — CARDIOLOGY REPORT
--------------- APPROVED REPORT --------------





EKG Measurement

Heart [id number]

[id number]

[id number]

[id number]



<Conclusion>

Sinus rhythm

Incomplete right bundle branch block

Septal infarct, age undetermined

Abnormal ECG

## 2020-12-05 NOTE — GENERAL PROGRESS NOTE
Subjective


ROS Limited/Unobtainable:  Yes


Allergies:  


Coded Allergies:  


     No Known Allergies (Unverified , 8/19/18)





Objective





Last 24 Hour Vital Signs








  Date Time  Temp Pulse Resp B/P (MAP) Pulse Ox O2 Delivery O2 Flow Rate FiO2


 


12/5/20 16:01 97.9 56 20 111/68 (82) 100   


 


12/5/20 12:00      Nasal Cannula 4.0 


 


12/5/20 12:00 97.5 59 18 122/68 (86) 100   


 


12/5/20 11:04    114/73 (87) 100   


 


12/5/20 11:00  59 12 114/73 (87) 100   


 


12/5/20 10:00  59 11 118/69 (85) 100   


 


12/5/20 09:00  56 12 114/73 (87) 100   


 


12/5/20 08:00      Nasal Cannula 4.0 


 


12/5/20 08:00  56      


 


12/5/20 08:00 98.0 53 13 109/70 (83) 100   


 


12/5/20 07:00  60 14 110/60 (77) 100   


 


12/5/20 06:00  54 12 112/60 (77) 100   


 


12/5/20 05:00  56 13 120/66 (84) 100   


 


12/5/20 04:00      Nasal Cannula 4.0 


 


12/5/20 04:00 98.2 53 12 108/58 (75) 100   


 


12/5/20 04:00  52      


 


12/5/20 03:00  55 12 108/58 (75) 100   


 


12/5/20 02:00  53 16 107/63 (78) 100   


 


12/5/20 01:00  52 12 114/62 (79) 100   


 


12/5/20 00:00 98.5 55 12 102/58 (73) 100   


 


12/5/20 00:00      Nasal Cannula 4.0 


 


12/5/20 00:00  57      


 


12/4/20 23:00  57 13 102/67 (79) 100   


 


12/4/20 22:00  69 15 114/65 (81) 99   


 


12/4/20 21:00  66 15 106/64 (78) 100   


 


12/4/20 20:00  73      


 


12/4/20 20:00 98.4 66 12 103/63 (76) 99   


 


12/4/20 20:00      Nasal Cannula 4.0 


 


12/4/20 19:00  70 14 94/48 (63) 97   


 


12/4/20 18:00  61 13 96/52 (67) 100   


 


12/4/20 17:00 98.0 60 14 88/52 (64) 100   

















Intake and Output  


 


 12/4/20 12/5/20





 19:00 07:00


 


Intake Total 700 ml 1100 ml


 


Output Total 440 ml 410 ml


 


Balance 260 ml 690 ml


 


  


 


IV Total 700 ml 1100 ml


 


Output Urine Total 440 ml 410 ml








Height (Feet):  5


Height (Inches):  8.00


Weight (Pounds):  114





Assessment/Plan


Problem List:  


(1) Encephalopathy


ICD Codes:  G93.40 - Encephalopathy, unspecified


SNOMED:  16522080


(2) Fever


ICD Codes:  R50.9 - Fever, unspecified


SNOMED:  140069260


(3) Dehydration


ICD Codes:  E86.0 - Dehydration


SNOMED:  92396418


(4) Septic shock


ICD Codes:  A41.9 - Sepsis, unspecified organism; R65.21 - Severe sepsis with 

septic shock


SNOMED:  15503116


(5) Electrolyte imbalance


ICD Codes:  E87.8 - Other disorders of electrolyte and fluid balance, not 

elsewhere classified


SNOMED:  797554828


(6) HTN (hypertension)


ICD Codes:  I10 - Essential (primary) hypertension


SNOMED:  14058542


(7) MONICA (acute kidney injury)


ICD Codes:  N17.9 - Acute kidney failure, unspecified


SNOMED:  9407429, 17591798


Status:  progressing


Assessment/Plan:


afebrile


arf


s/p septic shock


off pressors


improving


confused


obs


encephalopathy











Martin Payan MD                   Dec 5, 2020 16:58

## 2020-12-05 NOTE — PULMONOLOGY PROGRESS NOTE
Subjective


ROS Limited/Unobtainable:  Yes


Interval Events:  no major changes


Constitutional:  Reports: no symptoms


HEENT:  Repors: no symptoms


Respiratory:  Reports: no symptoms


Cardiovascular:  Reports: no symptoms


Gastrointestinal/Abdominal:  Reports: no symptoms


Genitourinary:  Reports: no symptoms


Allergies:  


Coded Allergies:  


     No Known Allergies (Unverified , 8/19/18)





Objective





Last 24 Hour Vital Signs








  Date Time  Temp Pulse Resp B/P (MAP) Pulse Ox O2 Delivery O2 Flow Rate FiO2


 


12/5/20 12:00      Nasal Cannula 4.0 


 


12/5/20 12:00 97.5 59 18 122/68 (86) 100   


 


12/5/20 11:04    114/73 (87) 100   


 


12/5/20 11:00  59 12 114/73 (87) 100   


 


12/5/20 10:00  59 11 118/69 (85) 100   


 


12/5/20 09:00  56 12 114/73 (87) 100   


 


12/5/20 08:00      Nasal Cannula 4.0 


 


12/5/20 08:00  56      


 


12/5/20 08:00 98.0 53 13 109/70 (83) 100   


 


12/5/20 07:00  60 14 110/60 (77) 100   


 


12/5/20 06:00  54 12 112/60 (77) 100   


 


12/5/20 05:00  56 13 120/66 (84) 100   


 


12/5/20 04:00      Nasal Cannula 4.0 


 


12/5/20 04:00 98.2 53 12 108/58 (75) 100   


 


12/5/20 04:00  52      


 


12/5/20 03:00  55 12 108/58 (75) 100   


 


12/5/20 02:00  53 16 107/63 (78) 100   


 


12/5/20 01:00  52 12 114/62 (79) 100   


 


12/5/20 00:00 98.5 55 12 102/58 (73) 100   


 


12/5/20 00:00      Nasal Cannula 4.0 


 


12/5/20 00:00  57      


 


12/4/20 23:00  57 13 102/67 (79) 100   


 


12/4/20 22:00  69 15 114/65 (81) 99   


 


12/4/20 21:00  66 15 106/64 (78) 100   


 


12/4/20 20:00  73      


 


12/4/20 20:00 98.4 66 12 103/63 (76) 99   


 


12/4/20 20:00      Nasal Cannula 4.0 


 


12/4/20 19:00  70 14 94/48 (63) 97   


 


12/4/20 18:00  61 13 96/52 (67) 100   


 


12/4/20 17:00 98.0 60 14 88/52 (64) 100   


 


12/4/20 16:30    106/52    


 


12/4/20 16:00  60      


 


12/4/20 16:00  64 13 102/57 (72) 100   


 


12/4/20 16:00      Nasal Cannula 4.0 

















Intake and Output  


 


 12/4/20 12/5/20





 18:59 06:59


 


Intake Total 700 ml 1100 ml


 


Output Total 440 ml 410 ml


 


Balance 260 ml 690 ml


 


  


 


IV Total 700 ml 1100 ml


 


Output Urine Total 440 ml 410 ml








Objective


12/5/2020 obtunded; bradycardia; Dr. Otero aware


General Appearance:  no acute distress


HEENT:  other - on NC


Respiratory:  chest wall non-tender, decreased breath sounds


Cardiovascular:  normal peripheral pulses


Abdomen:  normal bowel sounds, distended


Genitourinary:  other - Ndiaye


Extremities:  no cyanosis





Microbiology








 Date/Time


Source Procedure


Growth Status





 


 12/3/20 16:00


Rectum - Final


NO CARBAPENEM-RESISTANT ENTEROBACTERI... Complete


 


 12/3/20 16:00


Rectum  Received


 


 12/3/20 16:00


Nasal Nares MRSA Culture - Final


Staphylococcus Aureus - Mrsa Complete


 


 12/3/20 15:10


Nasopharynx Coronavirus COVID-19 PCR ([facility]) - Final Complete





 12/3/20 14:05


Nasal Nares - Final Complete


 


 12/3/20 14:05


Nasal Nares - Final Complete


 


 12/3/20 13:35


Blood Blood Culture - Preliminary


Staphylococcus Sp Coag Neg Resulted


 


 12/3/20 13:20


Blood Blood Culture - Preliminary


NO GROWTH AFTER 24 HOURS Resulted











Current Medications








 Medications


  (Trade)  Dose


 Ordered  Sig/Kadeem


 Route


 PRN Reason  Start Time


 Stop Time Status Last Admin


Dose Admin


 


 Acetaminophen


  (Tylenol)  650 mg  Q4H  PRN


 ORAL


 Mild Pain (Pain Scale 1-3)  12/3/20 19:30


 1/2/21 19:29   





 


 Acetaminophen


  (Tylenol)  650 mg  Q4H  PRN


 ORAL


 Temp >100.5  12/3/20 19:30


 1/2/21 19:29   





 


 Aspirin


  (Ecotrin)  81 mg  DAILY


 ORAL


   12/4/20 09:00


 1/18/21 08:59   





 


 Barium Sulfate


  (Varibar Honey)  250 ml  NOW  PRN


 MC


 RAD  12/3/20 20:15


 12/6/20 20:07   





 


 Barium Sulfate


  (Varibar Nectar)  240 ml  NOW  PRN


 MC


 RAD  12/3/20 20:15


 12/6/20 20:07   





 


 Barium Sulfate


  (Varibar Pudding)  230 ml  NOW  PRN


 MC


 RAD  12/3/20 20:15


 12/6/20 20:07   





 


 Barium Sulfate


  (Varibar Thin


 Liquid powder)  148 gm  NOW  PRN


 MC


 RAD  12/3/20 20:15


 12/6/20 20:07   





 


 Cefepime HCl 1 gm/


 Dextrose  55 ml @ 


 110 mls/hr  EVERY 12  HOURS


 IVPB


   12/4/20 10:30


 12/11/20 10:29  12/5/20 08:23





 


 Chlorhexidine


 Gluconate


  (Diane-Hex 2%)  1 applic  DAILY@2000


 TOPIC


   12/3/20 20:00


 3/3/21 19:59  12/4/20 20:35





 


 Dextrose/Sodium


 Chloride  1,000 ml @ 


 100 mls/hr  Q10H


 IV


   12/3/20 19:30


 1/2/21 19:29  12/5/20 11:15





 


 Docusate Sodium


  (Colace)  250 mg  DAILY


 ORAL


   12/4/20 09:00


 1/3/21 08:59   





 


 Norepinephrine


 Bitartrate  246 ml @ 0


 mls/hr  Q24H


 IV


   12/3/20 16:30


 12/6/20 16:29  12/3/20 17:35





 


 Pantoprazole


  (Protonix)  40 mg  EVERY 12  HOURS


 IVP


   12/3/20 21:00


 1/2/21 20:59  12/5/20 08:23





 


 Sennosides


  (Senokot)  8.6 mg  DAILY


 ORAL


   12/4/20 09:00


 1/3/21 08:59   





 


 Vancomycin HCl


  (Vanco pharmacy


 to dose)  1 ea  DAILY  PRN


 MISC


 Per rx protocol  12/4/20 10:00


 1/3/21 09:59   





 


 Vancomycin HCl 1


 gm/Dextrose  275 ml @ 


 183.708


 mls/hr  Q24H


 IVPB


   12/4/20 14:00


 12/9/20 13:59  12/5/20 13:39





 


 Vitamin D


  (Vitamin D)  1,000 intlu  DAILY


 ORAL


   12/4/20 09:00


 1/3/21 08:59   














Assessment/Plan


Assessment/Plan


IMPRESSION:


1. Shock, likely septic. On pressors


2. Fever.


3. History of CVA.


4. CHF.


5. History of hep C.





DISCUSSION: 





Continue broad-spectrum antibiotics.  IV fluids.  Pressors prn.


DVT and GI prophylaxes. 


Currently saturating well on low-flow oxygen.





The care for this patient was discussed with my supervising physician.


Seen and examined by Dr. Evans as well.


Time spent for this care was approximately 31 minutes.











Michael Ashley                  Dec 5, 2020 15:23

## 2020-12-05 NOTE — CARDIOLOGY REPORT
--------------- APPROVED REPORT --------------





EXAM: Two-dimensional and M-mode echocardiogram with Doppler and color Doppler.



INDICATION

Congestive Heart Failure 



M-Mode DIMENSIONS 

IVSd1.3 (0.7-1.1cm)Left Atrium (MM)3.6 (1.6-4.0cm)

LVDd4.4 (3.5-5.6cm)Aortic Root3.3 (2.0-3.7cm)

PWd0.9 (0.7-1.1cm)Aortic Cusp Exc.1.4 (1.5-2.0cm)

IVSs2.0 cmEPSS0.2 (>1.0cm)

LVDs1.3 (2.5-4.0cm)

PWs1.9 cm



<Conclusion>

Technically difficult study due to patient's position and very contracted limbs.

Normal left ventricular chamber size, systolic function and wall motion.

Left ventricular ejection fraction estimated to be 65 %.

Mild left ventricular hypertrophy.

No evidence of pericardial effusion. 

All other cardiac chamber sizes are within normal limits. 

Moderate aortic valve sclerosis with borderline cusp excursion.

Thickened mitral valve leaflets with normal excursion.

Normal mitral annulus and moderate aortic root calcification.

Normal pulmonic valve structure.  

Normal tricuspid valve structure. 

IVC at normal size with physiologic collapse.



A  color flow and spectral Doppler study was performed and revealed:

Trace aortic regurgitation.

Peak aortic valve gradient of  14   mm Hg and a mean of 6  mmHg.

Trace mitral regurgitation.

Mitral diastolic velocities suggest reduced left ventricular relaxation c/w mild LV diastolic 

dysfunction (Grade I ).  

Trace tricuspid regurgitation.

Tricuspid  systolic velocities suggests peak right ventricular systolic pressure of 17 mmHg.

Mild pulmonic regurgitation present.

## 2020-12-05 NOTE — CARDIAC ELECTROPHYSIOLOGY PN
Assessment/Plan


Assessment/Plan


1. S/P septic shock in this patient with lactic acid of 2.9 and white count of 

11.5.  


    Ruled out for COVID. Influenza is negative. On IV fluids  Ruled out for 

myocardial infarction.  


    EF 65%.





2. Fever and cough and respiratory failure on IV antibiotic per ID. 





3. Anemia.  Hemoglobin 9.5.


4. Renal failure with BUN of 35, creatinine of 1.5.  





5. Rhabdomyolysis with CPK of 2700.


6. History of congestive heart failure with Nl EF





7. 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





8. Complete right bundle-branch block and history of bradycardia with


heart rate in the 50s.  Off any sinus or AV jonathan blocking agents.  We


will watch the patient on telemetry.





9. Dysphagia, PEG pending





DW RN





Subjective


Subjective


Transferred from [facility]. Being Ruled out for Covid. NPO for PEG per Dr De La Cruz





Objective





Last 24 Hour Vital Signs








  Date Time  Temp Pulse Resp B/P (MAP) Pulse Ox O2 Delivery O2 Flow Rate FiO2


 


12/5/20 16:01 97.9 56 20 111/68 (82) 100   


 


12/5/20 12:00      Nasal Cannula 4.0 


 


12/5/20 12:00 97.5 59 18 122/68 (86) 100   


 


12/5/20 11:04    114/73 (87) 100   


 


12/5/20 11:00  59 12 114/73 (87) 100   


 


12/5/20 10:00  59 11 118/69 (85) 100   


 


12/5/20 09:00  56 12 114/73 (87) 100   


 


12/5/20 08:00      Nasal Cannula 4.0 


 


12/5/20 08:00  56      


 


12/5/20 08:00 98.0 53 13 109/70 (83) 100   


 


12/5/20 07:00  60 14 110/60 (77) 100   


 


12/5/20 06:00  54 12 112/60 (77) 100   


 


12/5/20 05:00  56 13 120/66 (84) 100   


 


12/5/20 04:00      Nasal Cannula 4.0 


 


12/5/20 04:00 98.2 53 12 108/58 (75) 100   


 


12/5/20 04:00  52      


 


12/5/20 03:00  55 12 108/58 (75) 100   


 


12/5/20 02:00  53 16 107/63 (78) 100   


 


12/5/20 01:00  52 12 114/62 (79) 100   


 


12/5/20 00:00 98.5 55 12 102/58 (73) 100   


 


12/5/20 00:00      Nasal Cannula 4.0 


 


12/5/20 00:00  57      


 


12/4/20 23:00  57 13 102/67 (79) 100   


 


12/4/20 22:00  69 15 114/65 (81) 99   


 


12/4/20 21:00  66 15 106/64 (78) 100   


 


12/4/20 20:00  73      


 


12/4/20 20:00 98.4 66 12 103/63 (76) 99   


 


12/4/20 20:00      Nasal Cannula 4.0 


 


12/4/20 19:00  70 14 94/48 (63) 97   

















Intake and Output  


 


 12/4/20 12/5/20





 19:00 07:00


 


Intake Total 700 ml 1100 ml


 


Output Total 440 ml 410 ml


 


Balance 260 ml 690 ml


 


  


 


IV Total 700 ml 1100 ml


 


Output Urine Total 440 ml 410 ml











Microbiology








 Date/Time


Source Procedure


Growth Status





 


 12/3/20 16:00


Rectum - Final


NO CARBAPENEM-RESISTANT ENTEROBACTERI... Complete


 


 12/3/20 16:00


Rectum  Received


 


 12/3/20 16:00


Nasal Nares MRSA Culture - Final


Staphylococcus Aureus - Mrsa Complete


 


 12/3/20 15:10


Nasopharynx Coronavirus COVID-19 PCR ([facility]) - Final Complete





 12/3/20 14:05


Nasal Nares - Final Complete


 


 12/3/20 14:05


Nasal Nares - Final Complete


 


 12/3/20 13:35


Blood Blood Culture - Preliminary


Staphylococcus Sp Coag Neg Resulted


 


 12/3/20 13:20


Blood Blood Culture - Preliminary


NO GROWTH AFTER 48 HOURS Resulted








Objective





HEAD AND NECK:  Shows no JVD.


LUNGS:  Coarse rhonchi.


CARDIOVASCULAR:  Shows regular S1 and S2 with no gallop or murmur.


ABDOMEN:  Soft.


EXTREMITIES:  No pitting edema.











Levi Otero MD                Dec 5, 2020 18:35

## 2020-12-05 NOTE — CARDIOLOGY REPORT
--------------- APPROVED REPORT --------------





EKG Measurement

Heart [id number]

UT [id number]

[id number]

[id number]

[id number]



<Conclusion>

Normal sinus rhythm

Right bundle branch block

Abnormal ECG

## 2020-12-06 VITALS — SYSTOLIC BLOOD PRESSURE: 129 MMHG | DIASTOLIC BLOOD PRESSURE: 73 MMHG

## 2020-12-06 VITALS — DIASTOLIC BLOOD PRESSURE: 65 MMHG | SYSTOLIC BLOOD PRESSURE: 119 MMHG

## 2020-12-06 VITALS — SYSTOLIC BLOOD PRESSURE: 130 MMHG | DIASTOLIC BLOOD PRESSURE: 70 MMHG

## 2020-12-06 VITALS — DIASTOLIC BLOOD PRESSURE: 69 MMHG | SYSTOLIC BLOOD PRESSURE: 122 MMHG

## 2020-12-06 VITALS — SYSTOLIC BLOOD PRESSURE: 129 MMHG | DIASTOLIC BLOOD PRESSURE: 67 MMHG

## 2020-12-06 VITALS — DIASTOLIC BLOOD PRESSURE: 68 MMHG | SYSTOLIC BLOOD PRESSURE: 121 MMHG

## 2020-12-06 LAB
ADD MANUAL DIFF: YES
ALBUMIN SERPL-MCNC: 2 G/DL (ref 3.4–5)
ALBUMIN/GLOB SERPL: 0.6 {RATIO} (ref 1–2.7)
ALP SERPL-CCNC: 62 U/L (ref 46–116)
ALT SERPL-CCNC: 27 U/L (ref 12–78)
ANION GAP SERPL CALC-SCNC: 9 MMOL/L (ref 5–15)
AST SERPL-CCNC: 62 U/L (ref 15–37)
BILIRUB SERPL-MCNC: 0.8 MG/DL (ref 0.2–1)
BUN SERPL-MCNC: 12 MG/DL (ref 7–18)
CALCIUM SERPL-MCNC: 7.6 MG/DL (ref 8.5–10.1)
CHLORIDE SERPL-SCNC: 113 MMOL/L (ref 98–107)
CO2 SERPL-SCNC: 20 MMOL/L (ref 21–32)
CREAT SERPL-MCNC: 0.7 MG/DL (ref 0.55–1.3)
ERYTHROCYTE [DISTWIDTH] IN BLOOD BY AUTOMATED COUNT: 16.4 % (ref 11.6–14.8)
GLOBULIN SER-MCNC: 3.5 G/DL
HCT VFR BLD CALC: 24.9 % (ref 42–52)
HGB BLD-MCNC: 8.5 G/DL (ref 14.2–18)
MCV RBC AUTO: 90 FL (ref 80–99)
PHOSPHATE SERPL-MCNC: 2.6 MG/DL (ref 2.5–4.9)
PLATELET # BLD: 98 K/UL (ref 150–450)
POTASSIUM SERPL-SCNC: 3.2 MMOL/L (ref 3.5–5.1)
RBC # BLD AUTO: 2.77 M/UL (ref 4.7–6.1)
SODIUM SERPL-SCNC: 142 MMOL/L (ref 136–145)
WBC # BLD AUTO: 5.6 K/UL (ref 4.8–10.8)

## 2020-12-06 RX ADMIN — VITAMIN D, TAB 1000IU (100/BT) SCH INTLU: 25 TAB at 11:34

## 2020-12-06 RX ADMIN — Medication SCH MG: at 11:34

## 2020-12-06 RX ADMIN — PANTOPRAZOLE SODIUM SCH MG: 40 INJECTION, POWDER, FOR SOLUTION INTRAVENOUS at 11:33

## 2020-12-06 RX ADMIN — HUMAN INSULIN SCH MLS/HR: 100 INJECTION, SOLUTION SUBCUTANEOUS at 23:05

## 2020-12-06 RX ADMIN — DOCUSATE SODIUM SCH MG: 250 CAPSULE, LIQUID FILLED ORAL at 11:34

## 2020-12-06 RX ADMIN — HUMAN INSULIN SCH MLS/HR: 100 INJECTION, SOLUTION SUBCUTANEOUS at 11:44

## 2020-12-06 RX ADMIN — SODIUM CHLORIDE SCH MLS/HR: 0.9 INJECTION INTRAVENOUS at 23:05

## 2020-12-06 RX ADMIN — SODIUM CHLORIDE SCH MLS/HR: 0.9 INJECTION INTRAVENOUS at 11:33

## 2020-12-06 RX ADMIN — LACTULOSE SCH GM: 20 SOLUTION ORAL at 18:13

## 2020-12-06 RX ADMIN — HUMAN INSULIN SCH MLS/HR: 100 INJECTION, SOLUTION SUBCUTANEOUS at 18:07

## 2020-12-06 RX ADMIN — PANTOPRAZOLE SODIUM SCH MG: 40 INJECTION, POWDER, FOR SOLUTION INTRAVENOUS at 23:05

## 2020-12-06 RX ADMIN — LACTULOSE SCH GM: 20 SOLUTION ORAL at 13:30

## 2020-12-06 RX ADMIN — ASPIRIN SCH MG: 81 TABLET, DELAYED RELEASE ORAL at 11:34

## 2020-12-06 RX ADMIN — CHLORHEXIDINE GLUCONATE SCH APPLIC: 213 SOLUTION TOPICAL at 23:04

## 2020-12-06 NOTE — INFECTIOUS DISEASES PROG NOTE
Assessment/Plan


Assessment/Plan


IMPRESSION:  


Sepsis with septic shock,resolving


Dehydration, 


Lactic acidosis, 


Rhabdomyolysis, 


Acute renal failure.  


Cirrhosis and portal hypertension.  


Status post splenectomy, 


Hepatitis C.


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


Positive blood culture: contamination





RECOMMENDATION:  


Continue with cefepime 


Discontinue vancomycin.  


We will follow up the cultures.





Subjective


ROS Limited/Unobtainable:  Yes


Constitutional:  Reports: other - doing better , transferred out of ICU


Gastrointestinal/Abdominal:  Reports: other - removed his NG tube


Allergies:  


Coded Allergies:  


     No Known Allergies (Unverified , 8/19/18)





Objective





Last 24 Hour Vital Signs








  Date Time  Temp Pulse Resp B/P (MAP) Pulse Ox O2 Delivery O2 Flow Rate FiO2


 


12/6/20 12:00 97.7 62 18 129/73 (91) 99   


 


12/6/20 09:00      Nasal Cannula 3.0 


 


12/6/20 08:00 97.6 57 20 122/69 (86) 100   


 


12/6/20 08:00  53      


 


12/6/20 04:00  53      


 


12/6/20 04:00 97.5 58 20 119/65 (83) 100   


 


12/6/20 00:00  55      


 


12/6/20 00:00 97.5 62 20 121/68 (85) 100   


 


12/5/20 21:00      Nasal Cannula 3.0 


 


12/5/20 20:00 96.9 63 20 130/61 (84) 100   


 


12/5/20 20:00  57      


 


12/5/20 16:01 97.9 56 20 111/68 (82) 100   








Height (Feet):  5


Height (Inches):  8.00


Weight (Pounds):  114


HEENT:  mucous membranes moist


Respiratory/Chest:  lungs clear


Cardiovascular:  normal rate, other - RIJ central line


Abdomen:  soft, non tender


Extremities:  no edema


Neurologic/Psychiatric:  alert, responsive





Microbiology








 Date/Time


Source Procedure


Growth Status





 


 12/3/20 16:00


Rectum - Final


NO CARBAPENEM-RESISTANT ENTEROBACTERI... Complete


 


 12/3/20 16:00


Rectum VRE Culture - Final


Enterococcus Faecalis - Vre Complete


 


 12/3/20 16:00


Nasal Nares MRSA Culture - Final


Staphylococcus Aureus - Mrsa Complete


 


 12/3/20 15:10


Nasopharynx Coronavirus COVID-19 PCR ([facility]) - Final Complete











Laboratory Tests








Test


 12/6/20


04:00 12/6/20


14:00


 


White Blood Count


 5.6 K/UL


(4.8-10.8) 





 


Red Blood Count


 2.77 M/UL


(4.70-6.10)  L 





 


Hemoglobin


 8.5 G/DL


(14.2-18.0)  L 





 


Hematocrit


 24.9 %


(42.0-52.0)  L 





 


Mean Corpuscular Volume 90 FL (80-99)   


 


Mean Corpuscular Hemoglobin


 30.8 PG


(27.0-31.0) 





 


Mean Corpuscular Hemoglobin


Concent 34.3 G/DL


(32.0-36.0) 





 


Red Cell Distribution Width


 16.4 %


(11.6-14.8)  H 





 


Platelet Count


 98 K/UL


(150-450)  L 





 


Mean Platelet Volume


 6.7 FL


(6.5-10.1) 





 


Neutrophils (%) (Auto)


 % (45.0-75.0)


 





 


Lymphocytes (%) (Auto)


 % (20.0-45.0)


 





 


Monocytes (%) (Auto)  % (1.0-10.0)   


 


Eosinophils (%) (Auto)  % (0.0-3.0)   


 


Basophils (%) (Auto)  % (0.0-2.0)   


 


Differential Total Cells


Counted 100  


 





 


Neutrophils % (Manual) 71 % (45-75)   


 


Lymphocytes % (Manual) 11 % (20-45)  L 


 


Monocytes % (Manual) 14 % (1-10)  H 


 


Eosinophils % (Manual) 4 % (0-3)  H 


 


Basophils % (Manual) 0 % (0-2)   


 


Band Neutrophils 0 % (0-8)   


 


Platelet Estimate Decreased  L 


 


Platelet Morphology Normal   


 


Hypochromasia 1+   


 


Anisocytosis 1+   


 


Sodium Level


 142 MMOL/L


(136-145) 





 


Potassium Level


 3.2 MMOL/L


(3.5-5.1)  L 





 


Chloride Level


 113 MMOL/L


()  H 





 


Carbon Dioxide Level


 20 MMOL/L


(21-32)  L 





 


Anion Gap


 9 mmol/L


(5-15) 





 


Blood Urea Nitrogen


 12 mg/dL


(7-18) 





 


Creatinine


 0.7 MG/DL


(0.55-1.30) 





 


Estimat Glomerular Filtration


Rate > 60 mL/min


(>60) 





 


Glucose Level


 94 MG/DL


() 





 


Uric Acid


 4.2 MG/DL


(2.6-7.2) 





 


Calcium Level


 7.6 MG/DL


(8.5-10.1)  L 





 


Phosphorus Level


 2.6 MG/DL


(2.5-4.9) 





 


Magnesium Level


 1.4 MG/DL


(1.8-2.4)  L 





 


Total Bilirubin


 0.8 MG/DL


(0.2-1.0) 





 


Aspartate Amino Transf


(AST/SGOT) 62 U/L (15-37)


H 





 


Alanine Aminotransferase


(ALT/SGPT) 27 U/L (12-78)


 





 


Alkaline Phosphatase


 62 U/L


() 





 


C-Reactive Protein,


Quantitative 2.6 mg/dL


(0.00-0.90)  H 





 


Total Protein


 5.5 G/DL


(6.4-8.2)  L 





 


Albumin


 2.0 G/DL


(3.4-5.0)  L 





 


Globulin 3.5 g/dL   


 


Albumin/Globulin Ratio


 0.6 (1.0-2.7)


L 





 


Vancomycin Level Trough  Pending  











Current Medications








 Medications


  (Trade)  Dose


 Ordered  Sig/Kadeem


 Route


 PRN Reason  Start Time


 Stop Time Status Last Admin


Dose Admin


 


 Acetaminophen


  (Tylenol)  650 mg  Q4H  PRN


 ORAL


 Mild Pain (Pain Scale 1-3)  12/3/20 19:30


 1/2/21 19:29   





 


 Acetaminophen


  (Tylenol)  650 mg  Q4H  PRN


 ORAL


 Temp >100.5  12/3/20 19:30


 1/2/21 19:29   





 


 Aspirin


  (Ecotrin)  81 mg  DAILY


 ORAL


   12/4/20 09:00


 1/18/21 08:59  12/6/20 11:34





 


 Barium Sulfate


  (Varibar Honey)  250 ml  NOW  PRN


 MC


 RAD  12/3/20 20:15


 12/6/20 20:07   





 


 Barium Sulfate


  (Varibar Nectar)  240 ml  NOW  PRN


 MC


 RAD  12/3/20 20:15


 12/6/20 20:07   





 


 Barium Sulfate


  (Varibar Pudding)  230 ml  NOW  PRN


 MC


 RAD  12/3/20 20:15


 12/6/20 20:07   





 


 Barium Sulfate


  (Varibar Thin


 Liquid powder)  148 gm  NOW  PRN


 MC


 RAD  12/3/20 20:15


 12/6/20 20:07   





 


 Cefepime HCl 1 gm/


 Dextrose  55 ml @ 


 110 mls/hr  EVERY 12  HOURS


 IVPB


   12/4/20 10:30


 12/11/20 10:29  12/6/20 11:33





 


 Chlorhexidine


 Gluconate


  (Diane-Hex 2%)  1 applic  DAILY@2000


 TOPIC


   12/3/20 20:00


 3/3/21 19:59  12/5/20 21:40





 


 Dextrose/Sodium


 Chloride  1,000 ml @ 


 100 mls/hr  Q10H


 IV


   12/3/20 19:30


 1/2/21 19:29  12/6/20 11:44





 


 Docusate Sodium


  (Colace)  250 mg  DAILY


 ORAL


   12/4/20 09:00


 1/3/21 08:59  12/6/20 11:34





 


 Lactulose


  (Cephulac)  20 gm  THREE TIMES A  DAY


 ORAL


   12/6/20 13:00


 1/5/21 12:59  12/6/20 13:30





 


 Pantoprazole


  (Protonix)  40 mg  EVERY 12  HOURS


 IVP


   12/3/20 21:00


 1/2/21 20:59  12/6/20 11:33





 


 Sennosides


  (Senokot)  8.6 mg  DAILY


 ORAL


   12/4/20 09:00


 1/3/21 08:59  12/6/20 11:34





 


 Vancomycin HCl


  (Vanco pharmacy


 to dose)  1 ea  DAILY  PRN


 MISC


 Per rx protocol  12/4/20 10:00


 1/3/21 09:59   





 


 Vancomycin HCl 1


 gm/Dextrose  275 ml @ 


 183.708


 mls/hr  Q24H


 IVPB


   12/4/20 14:00


 12/9/20 13:59  12/5/20 13:39





 


 Vitamin D


  (Vitamin D)  1,000 intlu  DAILY


 ORAL


   12/4/20 09:00


 1/3/21 08:59  12/6/20 11:34




















Jeremy Martin MD                Dec 6, 2020 14:13

## 2020-12-06 NOTE — CARDIAC ELECTROPHYSIOLOGY PN
Assessment/Plan


Assessment/Plan


1. S/P septic shock in this patient with lactic acid of 2.9 and white count of 

11.5.  


    Ruled out for COVID. Influenza is negative. On IV fluids  Ruled out for 

myocardial infarction.  


    EF 65%.





2. Fever and cough and respiratory failure on IV antibiotic per ID. 





3. Anemia.  Hemoglobin 9.5.


4. Renal failure with BUN of 35, creatinine of 1.5.  





5. Rhabdomyolysis with CPK of 2700.


6. History of congestive heart failure with Nl EF





7. 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





8. Complete right bundle-branch block and history of bradycardia with


heart rate in the 50s.  Off any sinus or AV jonathan blocking agents.  





9. Dysphagia, S/P NGT. Family refused PEG





DW RN





Subjective


Subjective


Being Ruled out for Covid. NGT was placed as family refused PEG





Objective





Last 24 Hour Vital Signs








  Date Time  Temp Pulse Resp B/P (MAP) Pulse Ox O2 Delivery O2 Flow Rate FiO2


 


12/6/20 16:00  63      


 


12/6/20 16:00 97.9 67 20 129/67 (87) 100   


 


12/6/20 12:00 97.7 62 18 129/73 (91) 99   


 


12/6/20 12:00  62      


 


12/6/20 09:00      Nasal Cannula 3.0 


 


12/6/20 08:00 97.6 57 20 122/69 (86) 100   


 


12/6/20 08:00  53      


 


12/6/20 04:00  53      


 


12/6/20 04:00 97.5 58 20 119/65 (83) 100   


 


12/6/20 00:00  55      


 


12/6/20 00:00 97.5 62 20 121/68 (85) 100   


 


12/5/20 21:00      Nasal Cannula 3.0 

















Intake and Output  


 


 12/5/20 12/6/20





 19:00 07:00


 


Intake Total 1122.416 ml 800 ml


 


Output Total 410 ml 900 ml


 


Balance 712.416 ml -100 ml


 


  


 


IV Total 1122.416 ml 800 ml


 


Output Urine Total 410 ml 900 ml











Laboratory Tests








Test


 12/6/20


04:00 12/6/20


14:00


 


White Blood Count


 5.6 K/UL


(4.8-10.8) 





 


Red Blood Count


 2.77 M/UL


(4.70-6.10)  L 





 


Hemoglobin


 8.5 G/DL


(14.2-18.0)  L 





 


Hematocrit


 24.9 %


(42.0-52.0)  L 





 


Mean Corpuscular Volume 90 FL (80-99)   


 


Mean Corpuscular Hemoglobin


 30.8 PG


(27.0-31.0) 





 


Mean Corpuscular Hemoglobin


Concent 34.3 G/DL


(32.0-36.0) 





 


Red Cell Distribution Width


 16.4 %


(11.6-14.8)  H 





 


Platelet Count


 98 K/UL


(150-450)  L 





 


Mean Platelet Volume


 6.7 FL


(6.5-10.1) 





 


Neutrophils (%) (Auto)


 % (45.0-75.0)


 





 


Lymphocytes (%) (Auto)


 % (20.0-45.0)


 





 


Monocytes (%) (Auto)  % (1.0-10.0)   


 


Eosinophils (%) (Auto)  % (0.0-3.0)   


 


Basophils (%) (Auto)  % (0.0-2.0)   


 


Differential Total Cells


Counted 100  


 





 


Neutrophils % (Manual) 71 % (45-75)   


 


Lymphocytes % (Manual) 11 % (20-45)  L 


 


Monocytes % (Manual) 14 % (1-10)  H 


 


Eosinophils % (Manual) 4 % (0-3)  H 


 


Basophils % (Manual) 0 % (0-2)   


 


Band Neutrophils 0 % (0-8)   


 


Platelet Estimate Decreased  L 


 


Platelet Morphology Normal   


 


Hypochromasia 1+   


 


Anisocytosis 1+   


 


Sodium Level


 142 MMOL/L


(136-145) 





 


Potassium Level


 3.2 MMOL/L


(3.5-5.1)  L 





 


Chloride Level


 113 MMOL/L


()  H 





 


Carbon Dioxide Level


 20 MMOL/L


(21-32)  L 





 


Anion Gap


 9 mmol/L


(5-15) 





 


Blood Urea Nitrogen


 12 mg/dL


(7-18) 





 


Creatinine


 0.7 MG/DL


(0.55-1.30) 





 


Estimat Glomerular Filtration


Rate > 60 mL/min


(>60) 





 


Glucose Level


 94 MG/DL


() 





 


Uric Acid


 4.2 MG/DL


(2.6-7.2) 





 


Calcium Level


 7.6 MG/DL


(8.5-10.1)  L 





 


Phosphorus Level


 2.6 MG/DL


(2.5-4.9) 





 


Magnesium Level


 1.4 MG/DL


(1.8-2.4)  L 





 


Total Bilirubin


 0.8 MG/DL


(0.2-1.0) 





 


Aspartate Amino Transf


(AST/SGOT) 62 U/L (15-37)


H 





 


Alanine Aminotransferase


(ALT/SGPT) 27 U/L (12-78)


 





 


Alkaline Phosphatase


 62 U/L


() 





 


C-Reactive Protein,


Quantitative 2.6 mg/dL


(0.00-0.90)  H 





 


Total Protein


 5.5 G/DL


(6.4-8.2)  L 





 


Albumin


 2.0 G/DL


(3.4-5.0)  L 





 


Globulin 3.5 g/dL   


 


Albumin/Globulin Ratio


 0.6 (1.0-2.7)


L 





 


Vancomycin Level Trough


 


 7.0 ug/mL


(5.0-12.0)








Objective





HEAD AND NECK:  Shows no JVD. NGT 


LUNGS:  Coarse rhonchi.


CARDIOVASCULAR:  Shows regular S1 and S2 with no gallop or murmur.


ABDOMEN:  Soft.


EXTREMITIES:  No pitting edema.











Levi Otero MD                Dec 6, 2020 20:27

## 2020-12-06 NOTE — NEPHROLOGY PROGRESS NOTE
Assessment/Plan


Problem List:  


(1) MONICA (acute kidney injury)


(2) Septic shock


(3) Dehydration


(4) Electrolyte imbalance


(5) Encephalopathy


Assessment


Acute renal failure


Dehydration


Anemia


Sepsis, shock


UTI


Toxic metabolic encephalopathy


Hypoalbuminemia, malnutrition with BMI of 17.3


Elevated CPK, rhabdo


Plan


December 6: Patient now in telemetry.  Labs reviewed.  Abnormal electrolytes 

addressed.  Continue per consultants.


December 5: Seen in ICU.  Discussed with RN.  No labs drawn today.  Will check 

labs tomorrow.  Continue per consultants.


December 4: Patient seen in ICU.  Discussed with RN.  Labs reviewed.  Stable 

from renal standpoint of view.  Ammonia is elevated.  Continue per consultants.


December 3: 


N.p.o.


Hydrate


Pressors


Ndiaye


Accurate intake and output


Avoid nephrotoxic's


Monitor renal parameters


Per orders


Per consultants





Subjective


ROS Limited/Unobtainable:  Yes





Objective


Objective





Last 24 Hour Vital Signs








  Date Time  Temp Pulse Resp B/P (MAP) Pulse Ox O2 Delivery O2 Flow Rate FiO2


 


12/6/20 08:00 97.6 57 20 122/69 (86) 100   


 


12/6/20 08:00  53      


 


12/6/20 04:00  53      


 


12/6/20 04:00 97.5 58 20 119/65 (83) 100   


 


12/6/20 00:00  55      


 


12/6/20 00:00 97.5 62 20 121/68 (85) 100   


 


12/5/20 21:00      Nasal Cannula 3.0 


 


12/5/20 20:00 96.9 63 20 130/61 (84) 100   


 


12/5/20 20:00  57      


 


12/5/20 16:01 97.9 56 20 111/68 (82) 100   


 


12/5/20 12:00      Nasal Cannula 4.0 


 


12/5/20 12:00 97.5 59 18 122/68 (86) 100   

















Intake and Output  


 


 12/5/20 12/6/20





 19:00 07:00


 


Intake Total 1122.416 ml 800 ml


 


Output Total 410 ml 900 ml


 


Balance 712.416 ml -100 ml


 


  


 


IV Total 1122.416 ml 800 ml


 


Output Urine Total 410 ml 900 ml








Laboratory Tests


12/6/20 04:00: 


White Blood Count 5.6, Red Blood Count 2.77L, Hemoglobin 8.5L, Hematocrit 24.9L,

Mean Corpuscular Volume 90, Mean Corpuscular Hemoglobin 30.8, Mean Corpuscular 

Hemoglobin Concent 34.3, Red Cell Distribution Width 16.4H, Platelet Count 98L, 

Mean Platelet Volume 6.7, Neutrophils (%) (Auto) , Lymphocytes (%) (Auto) , 

Monocytes (%) (Auto) , Eosinophils (%) (Auto) , Basophils (%) (Auto) , 

Differential Total Cells Counted 100, Neutrophils % (Manual) 71, Lymphocytes % 

(Manual) 11L, Monocytes % (Manual) 14H, Eosinophils % (Manual) 4H, Basophils % 

(Manual) 0, Band Neutrophils 0, Platelet Estimate DecreasedL, Platelet 

Morphology Normal, Hypochromasia 1+, Anisocytosis 1+, Sodium Level 142, 

Potassium Level 3.2L, Chloride Level 113H, Carbon Dioxide Level 20L, Anion Gap 

9, Blood Urea Nitrogen 12, Creatinine 0.7, Estimat Glomerular Filtration Rate > 

60, Glucose Level 94, Uric Acid 4.2, Calcium Level 7.6L, Phosphorus Level 2.6, 

Magnesium Level 1.4L, Total Bilirubin 0.8, Aspartate Amino Transf (AST/SGOT) 62H

, Alanine Aminotransferase (ALT/SGPT) 27, Alkaline Phosphatase 62, C-Reactive 

Protein, Quantitative 2.6H, Total Protein 5.5L, Albumin 2.0L, Globulin 3.5, 

Albumin/Globulin Ratio 0.6L


Height (Feet):  5


Height (Inches):  8.00


Weight (Pounds):  114


General Appearance:  no apparent distress, lethargic


EENT:  other - On nasal cannula


Cardiovascular:  normal rate


Respiratory/Chest:  decreased breath sounds


Abdomen:  distended











Alvin Alegria MD             Dec 6, 2020 11:34

## 2020-12-06 NOTE — CONSULTATION
DATE OF CONSULTATION:  12/06/2020

CHIEF COMPLAINT:  Dysphagia.



HISTORY OF PRESENT ILLNESS:  Most of history per chart.  The patient is a

70-year-old nursing home patient, was admitted to the hospital with

complaint of fever, history of CVA, schizophrenia.  In the hospital, he

was dysphagic, failed a swallow evaluation.  Nurses were not able to place

NG tube so GI consult requested for evaluation.



PAST MEDICAL HISTORY:

1. History of CVA.

2. Schizophrenia.

3. Anemia.

4. Hepatitis C.

5. Dysphagia.

6. Hepatic encephalopathy.



ALLERGIES:  No known drug allergies.



MEDICATIONS:  Please see medication reconciliation list.



SOCIAL HISTORY:  Currently lives in a nursing home.  No recent history of

tobacco, alcohol, or drug abuse.



FAMILY HISTORY:  Noncontributory.



REVIEW OF SYSTEMS:  Unable to obtain.



PHYSICAL EXAMINATION:

VITAL SIGNS:  Temperature is 97.6, pulse 67, respirations 20, blood

pressure is 122/69.

HEENT:  Normocephalic and atraumatic.  Mild pale conjunctivae.

NECK:  Supple.  No evidence of obvious lymphadenopathy.

CARDIOVASCULAR:  Regular rate and rhythm.  Plus S1, S2.

LUNGS:  Decreased breath sounds bilaterally based on the supine

exam.

ABDOMEN:  Soft and nontender.  No rebound.  No guarding.  No peritoneal

sign.

EXTREMITIES:  No cyanosis, no clubbing, no edema.



LABORATORY AND DIAGNOSTIC DATA:  White count 5.6, hemoglobin 8.5,

hematocrit 24, platelets are 98,000.  Potassium is 3.2, BUN is 12,

creatinine 0.7.



ASSESSMENT AND PLAN:  This is a 70-year-old male with numerous medical

problems.  From GI standpoint, has anemia which seems to be chronic.  No

evidence of any iron deficiency, has evidence of a cirrhosis based on

laboratories given low platelets, elevated ammonia level, and history of

hepatitis C.



I placed an NG tube at the bedside.  We are going to order KUB to see if

it is in the right place and start tube feeding.  Also, we are going to

start lactulose.  Repeat ammonia level tomorrow.  Repeat swallow

evaluation tomorrow.  Abdominal ultrasound.   We will check hepatitis

panel.  Send stool for OB.









  ______________________________________________

  Neal De La Cruz M.D.





DR:  Tania

D:  12/06/2020 10:04

T:  12/06/2020 10:24

JOB#:  [id number]

CC:

## 2020-12-06 NOTE — DIAGNOSTIC IMAGING REPORT
FILM ABDOMEN

 

HISTORY: Nasogastric tube 

 

TECHNIQUE:   Single view abdomen

 

COMPARISON:   December 6, 2020 at 10:49 AM

 

FINDINGS:   Single view abdomen demonstrates nasogastric tube with tip in 

the distal stomach versus proximal duodenum.  Biliary stent in place with 

right upper quadrant surgical clips.  Patient appears rotated.  

Degenerative changes of the hips and spine and sacroiliac joints.  

Nonspecific bowel gas pattern with stool within the large bowel.  No free 

air appreciated.  Enlarged heart noted.

 

IMPRESSION:   Enteric tube with tip in the distal stomach versus proximal 

duodenum

## 2020-12-06 NOTE — GENERAL PROGRESS NOTE
Subjective


ROS Limited/Unobtainable:  Yes


Allergies:  


Coded Allergies:  


     No Known Allergies (Unverified , 8/19/18)





Objective





Last 24 Hour Vital Signs








  Date Time  Temp Pulse Resp B/P (MAP) Pulse Ox O2 Delivery O2 Flow Rate FiO2


 


12/6/20 16:00  63      


 


12/6/20 16:00 97.9 67 20 129/67 (87) 100   


 


12/6/20 12:00 97.7 62 18 129/73 (91) 99   


 


12/6/20 12:00  62      


 


12/6/20 09:00      Nasal Cannula 3.0 


 


12/6/20 08:00 97.6 57 20 122/69 (86) 100   


 


12/6/20 08:00  53      


 


12/6/20 04:00  53      


 


12/6/20 04:00 97.5 58 20 119/65 (83) 100   


 


12/6/20 00:00  55      


 


12/6/20 00:00 97.5 62 20 121/68 (85) 100   

















Intake and Output  


 


 12/5/20 12/6/20





 19:00 07:00


 


Intake Total 1122.416 ml 800 ml


 


Output Total 410 ml 900 ml


 


Balance 712.416 ml -100 ml


 


  


 


IV Total 1122.416 ml 800 ml


 


Output Urine Total 410 ml 900 ml








Laboratory Tests


12/6/20 04:00: 


White Blood Count 5.6, Red Blood Count 2.77L, Hemoglobin 8.5L, Hematocrit 24.9L,

Mean Corpuscular Volume 90, Mean Corpuscular Hemoglobin 30.8, Mean Corpuscular 

Hemoglobin Concent 34.3, Red Cell Distribution Width 16.4H, Platelet Count 98L, 

Mean Platelet Volume 6.7, Neutrophils (%) (Auto) , Lymphocytes (%) (Auto) , 

Monocytes (%) (Auto) , Eosinophils (%) (Auto) , Basophils (%) (Auto) , Differe

ntial Total Cells Counted 100, Neutrophils % (Manual) 71, Lymphocytes % (Manual)

11L, Monocytes % (Manual) 14H, Eosinophils % (Manual) 4H, Basophils % (Manual) 

0, Band Neutrophils 0, Platelet Estimate DecreasedL, Platelet Morphology Normal,

Hypochromasia 1+, Anisocytosis 1+, Sodium Level 142, Potassium Level 3.2L, 

Chloride Level 113H, Carbon Dioxide Level 20L, Anion Gap 9, Blood Urea Nitrogen 

12, Creatinine 0.7, Estimat Glomerular Filtration Rate > 60, Glucose Level 94, 

Uric Acid 4.2, Calcium Level 7.6L, Phosphorus Level 2.6, Magnesium Level 1.4L, 

Total Bilirubin 0.8, Aspartate Amino Transf (AST/SGOT) 62H, Alanine 

Aminotransferase (ALT/SGPT) 27, Alkaline Phosphatase 62, C-Reactive Protein, 

Quantitative 2.6H, Total Protein 5.5L, Albumin 2.0L, Globulin 3.5, 

Albumin/Globulin Ratio 0.6L


12/6/20 14:00: Vancomycin Level Trough 7.0


Height (Feet):  5


Height (Inches):  8.00


Weight (Pounds):  114





Assessment/Plan


Problem List:  


(1) Encephalopathy


ICD Codes:  G93.40 - Encephalopathy, unspecified


SNOMED:  39201890


(2) Fever


ICD Codes:  R50.9 - Fever, unspecified


SNOMED:  708293927


(3) Dehydration


ICD Codes:  E86.0 - Dehydration


SNOMED:  36401231


(4) Septic shock


ICD Codes:  A41.9 - Sepsis, unspecified organism; R65.21 - Severe sepsis with 

septic shock


SNOMED:  56214599


(5) Electrolyte imbalance


ICD Codes:  E87.8 - Other disorders of electrolyte and fluid balance, not 

elsewhere classified


SNOMED:  258716121


(6) HTN (hypertension)


ICD Codes:  I10 - Essential (primary) hypertension


SNOMED:  49352835


(7) MONICA (acute kidney injury)


ICD Codes:  N17.9 - Acute kidney failure, unspecified


SNOMED:  0271094, 81085517


Status:  progressing


Assessment/Plan:


pna


check lytes


supportive rx


poor prognosis


arf


s/p septic alek


sowmya











Martin Payan MD                   Dec 6, 2020 21:28

## 2020-12-06 NOTE — DIAGNOSTIC IMAGING REPORT
FILM ABDOMEN

 

History: NG tube

 

Comparison: December 4, 2020

 

Findings: Single view abdomen demonstrates enteric tube with tip in the 

distal stomach.  Scoliotic curvature of the spine.  Nonspecific bowel gas 

pattern.  Bilateral lower lobe opacities.  Central line with tip in the 

right atrium versus atrial caval junction.  Biliary stent noted.  

Surgical clip demonstrated.  No free air or obstruction.

 

Impression: Enteric tube with tip in the distal stomach.

## 2020-12-06 NOTE — PULMONOLOGY PROGRESS NOTE
Subjective


ROS Limited/Unobtainable:  Yes


Interval Events:  no major changes


Constitutional:  Reports: no symptoms


HEENT:  Repors: no symptoms


Respiratory:  Reports: no symptoms


Cardiovascular:  Reports: no symptoms


Gastrointestinal/Abdominal:  Reports: no symptoms


Genitourinary:  Reports: no symptoms


Allergies:  


Coded Allergies:  


     No Known Allergies (Unverified , 8/19/18)





Objective





Last 24 Hour Vital Signs








  Date Time  Temp Pulse Resp B/P (MAP) Pulse Ox O2 Delivery O2 Flow Rate FiO2


 


12/6/20 08:00 97.6 57 20 122/69 (86) 100   


 


12/6/20 08:00  53      


 


12/6/20 04:00  53      


 


12/6/20 04:00 97.5 58 20 119/65 (83) 100   


 


12/6/20 00:00  55      


 


12/6/20 00:00 97.5 62 20 121/68 (85) 100   


 


12/5/20 21:00      Nasal Cannula 3.0 


 


12/5/20 20:00 96.9 63 20 130/61 (84) 100   


 


12/5/20 20:00  57      


 


12/5/20 16:01 97.9 56 20 111/68 (82) 100   

















Intake and Output  


 


 12/5/20 12/6/20





 19:00 07:00


 


Intake Total 1122.416 ml 800 ml


 


Output Total 410 ml 900 ml


 


Balance 712.416 ml -100 ml


 


  


 


IV Total 1122.416 ml 800 ml


 


Output Urine Total 410 ml 900 ml








Objective


12/6/2020 pulse 53-57; pt laying in bed; NAD


12/5/2020 obtunded; bradycardia; Dr. Otero aware


General Appearance:  no acute distress


HEENT:  other - on NC; enteric tube


Respiratory:  chest wall non-tender, decreased breath sounds


Cardiovascular:  normal peripheral pulses


Abdomen:  normal bowel sounds, distended


Genitourinary:  other - Ndiaye


Extremities:  no cyanosis





Microbiology








 Date/Time


Source Procedure


Growth Status





 


 12/3/20 16:00


Rectum - Final


NO CARBAPENEM-RESISTANT ENTEROBACTERI... Complete


 


 12/3/20 16:00


Rectum VRE Culture - Final


Enterococcus Faecalis - Vre Complete


 


 12/3/20 16:00


Nasal Nares MRSA Culture - Final


Staphylococcus Aureus - Mrsa Complete


 


 12/3/20 15:10


Nasopharynx Coronavirus COVID-19 PCR ([facility]) - Final Complete





 12/3/20 14:05


Nasal Nares - Final Complete


 


 12/3/20 14:05


Nasal Nares - Final Complete


 


 12/3/20 13:35


Blood Blood Culture - Final


Staphylococcus Epidermidis Complete


 


 12/3/20 13:20


Blood Blood Culture - Preliminary


NO GROWTH AFTER 48 HOURS Resulted








Laboratory Tests


12/6/20 04:00: 


White Blood Count 5.6, Red Blood Count 2.77L, Hemoglobin 8.5L, Hematocrit 24.9L,

Mean Corpuscular Volume 90, Mean Corpuscular Hemoglobin 30.8, Mean Corpuscular 

Hemoglobin Concent 34.3, Red Cell Distribution Width 16.4H, Platelet Count 98L, 

Mean Platelet Volume 6.7, Neutrophils (%) (Auto) , Lymphocytes (%) (Auto) , 

Monocytes (%) (Auto) , Eosinophils (%) (Auto) , Basophils (%) (Auto) , 

Differential Total Cells Counted 100, Neutrophils % (Manual) 71, Lymphocytes % 

(Manual) 11L, Monocytes % (Manual) 14H, Eosinophils % (Manual) 4H, Basophils % 

(Manual) 0, Band Neutrophils 0, Platelet Estimate DecreasedL, Platelet 

Morphology Normal, Hypochromasia 1+, Anisocytosis 1+, Sodium Level 142, 

Potassium Level 3.2L, Chloride Level 113H, Carbon Dioxide Level 20L, Anion Gap 

9, Blood Urea Nitrogen 12, Creatinine 0.7, Estimat Glomerular Filtration Rate > 

60, Glucose Level 94, Uric Acid 4.2, Calcium Level 7.6L, Phosphorus Level 2.6, 

Magnesium Level 1.4L, Total Bilirubin 0.8, Aspartate Amino Transf (AST/SGOT) 62H

, Alanine Aminotransferase (ALT/SGPT) 27, Alkaline Phosphatase 62, C-Reactive 

Protein, Quantitative 2.6H, Total Protein 5.5L, Albumin 2.0L, Globulin 3.5, 

Albumin/Globulin Ratio 0.6L





Current Medications








 Medications


  (Trade)  Dose


 Ordered  Sig/Kadeem


 Route


 PRN Reason  Start Time


 Stop Time Status Last Admin


Dose Admin


 


 Acetaminophen


  (Tylenol)  650 mg  Q4H  PRN


 ORAL


 Mild Pain (Pain Scale 1-3)  12/3/20 19:30


 1/2/21 19:29   





 


 Acetaminophen


  (Tylenol)  650 mg  Q4H  PRN


 ORAL


 Temp >100.5  12/3/20 19:30


 1/2/21 19:29   





 


 Aspirin


  (Ecotrin)  81 mg  DAILY


 ORAL


   12/4/20 09:00


 1/18/21 08:59  12/6/20 11:34





 


 Barium Sulfate


  (Varibar Honey)  250 ml  NOW  PRN


 MC


 RAD  12/3/20 20:15


 12/6/20 20:07   





 


 Barium Sulfate


  (Varibar Nectar)  240 ml  NOW  PRN


 MC


 RAD  12/3/20 20:15


 12/6/20 20:07   





 


 Barium Sulfate


  (Varibar Pudding)  230 ml  NOW  PRN


 MC


 RAD  12/3/20 20:15


 12/6/20 20:07   





 


 Barium Sulfate


  (Varibar Thin


 Liquid powder)  148 gm  NOW  PRN


 MC


 RAD  12/3/20 20:15


 12/6/20 20:07   





 


 Cefepime HCl 1 gm/


 Dextrose  55 ml @ 


 110 mls/hr  EVERY 12  HOURS


 IVPB


   12/4/20 10:30


 12/11/20 10:29  12/6/20 11:33





 


 Chlorhexidine


 Gluconate


  (Diane-Hex 2%)  1 applic  DAILY@2000


 TOPIC


   12/3/20 20:00


 3/3/21 19:59  12/5/20 21:40





 


 Dextrose/Sodium


 Chloride  1,000 ml @ 


 100 mls/hr  Q10H


 IV


   12/3/20 19:30


 1/2/21 19:29  12/6/20 11:44





 


 Docusate Sodium


  (Colace)  250 mg  DAILY


 ORAL


   12/4/20 09:00


 1/3/21 08:59  12/6/20 11:34





 


 Lactulose


  (Cephulac)  20 gm  THREE TIMES A  DAY


 ORAL


   12/6/20 13:00


 1/5/21 12:59   





 


 Magnesium Sulfate  100 ml @ 


 100 mls/hr  Q1H


 IVPB


   12/6/20 10:00


 12/6/20 13:59  12/6/20 12:54





 


 Pantoprazole


  (Protonix)  40 mg  EVERY 12  HOURS


 IVP


   12/3/20 21:00


 1/2/21 20:59  12/6/20 11:33





 


 Sennosides


  (Senokot)  8.6 mg  DAILY


 ORAL


   12/4/20 09:00


 1/3/21 08:59  12/6/20 11:34





 


 Vancomycin HCl


  ([facility]


 to dose)  1 ea  DAILY  PRN


 MISC


 Per rx protocol  12/4/20 10:00


 1/3/21 09:59   





 


 Vancomycin HCl 1


 gm/Dextrose  275 ml @ 


 183.708


 mls/hr  Q24H


 IVPB


   12/4/20 14:00


 12/9/20 13:59  12/5/20 13:39





 


 Vitamin D


  (Vitamin D)  1,000 intlu  DAILY


 ORAL


   12/4/20 09:00


 1/3/21 08:59  12/6/20 11:34














Assessment/Plan


Assessment/Plan


IMPRESSION:


1. Shock, likely septic. Off pressors now


2. Fever.


3. History of CVA.


4. CHF.


5. History of hep C.


6. Hypoxemia





DISCUSSION: 


Continue broad-spectrum antibiotics.  IV fluids.  Pressors dc


DVT and GI prophylaxes. 


Currently saturating well on low-flow oxygen.





The care for this patient was discussed with my supervising physician.


Seen and examined by Dr. Evans as well.


The history of Terrence Reese has been reviewed and management options for him have been

examined and discussed by Edu Evans. I have personally examined and 

interviewed the patient.








Time spent for this care was approximately 31 minutes.











Michael Ashley                  Dec 6, 2020 13:12


Edu Evans MD            Dec 6, 2020 16:43

## 2020-12-06 NOTE — DIAGNOSTIC IMAGING REPORT
US ABDOMEN

 

HISTORY: Abdominal pain 

 

TECHNIQUE:   Transabdominal ultrasound of the abdomen was performed.

 

COMPARISON:   Ultrasound of 22 January 2020

 

FINDINGS:   Limited exam.  Right pleural effusion demonstrated.  

Prominent common bile duct..  Liver appears echogenic.  Pancreatic head 

measures 3.6 cm with echogenic appearance.  Mildly prominent fluid-filled 

gallbladder demonstrated.  Gallbladder wall measures 0.24 cm.  Common 

bile duct measures 8.5 mm.  Possible echogenic material in the pancreatic 

duct.  Right kidney is not well seen.  Normal portal venous flow.

 

IMPRESSION:   Limited exam.  Possible echogenic material in the 

pancreatic duct.  Consider follow-up with MRCP.  Fatty changes of the 

liver with fluid-filled gallbladder.  Right pleural effusion noted.

## 2020-12-07 VITALS — DIASTOLIC BLOOD PRESSURE: 78 MMHG | SYSTOLIC BLOOD PRESSURE: 136 MMHG

## 2020-12-07 VITALS — DIASTOLIC BLOOD PRESSURE: 76 MMHG | SYSTOLIC BLOOD PRESSURE: 145 MMHG

## 2020-12-07 VITALS — DIASTOLIC BLOOD PRESSURE: 72 MMHG | SYSTOLIC BLOOD PRESSURE: 110 MMHG

## 2020-12-07 VITALS — SYSTOLIC BLOOD PRESSURE: 130 MMHG | DIASTOLIC BLOOD PRESSURE: 84 MMHG

## 2020-12-07 VITALS — DIASTOLIC BLOOD PRESSURE: 77 MMHG | SYSTOLIC BLOOD PRESSURE: 139 MMHG

## 2020-12-07 VITALS — DIASTOLIC BLOOD PRESSURE: 72 MMHG | SYSTOLIC BLOOD PRESSURE: 127 MMHG

## 2020-12-07 LAB
ADD MANUAL DIFF: NO
ALBUMIN SERPL-MCNC: 2.1 G/DL (ref 3.4–5)
ALBUMIN/GLOB SERPL: 0.6 {RATIO} (ref 1–2.7)
ALP SERPL-CCNC: 75 U/L (ref 46–116)
ALT SERPL-CCNC: 31 U/L (ref 12–78)
ANION GAP SERPL CALC-SCNC: 6 MMOL/L (ref 5–15)
AST SERPL-CCNC: 49 U/L (ref 15–37)
BASOPHILS NFR BLD AUTO: 1 % (ref 0–2)
BILIRUB SERPL-MCNC: 0.6 MG/DL (ref 0.2–1)
BUN SERPL-MCNC: 10 MG/DL (ref 7–18)
CALCIUM SERPL-MCNC: 7.5 MG/DL (ref 8.5–10.1)
CHLORIDE SERPL-SCNC: 111 MMOL/L (ref 98–107)
CO2 SERPL-SCNC: 23 MMOL/L (ref 21–32)
CREAT SERPL-MCNC: 0.6 MG/DL (ref 0.55–1.3)
EOSINOPHIL NFR BLD AUTO: 6.7 % (ref 0–3)
ERYTHROCYTE [DISTWIDTH] IN BLOOD BY AUTOMATED COUNT: 16.4 % (ref 11.6–14.8)
GLOBULIN SER-MCNC: 3.7 G/DL
HCT VFR BLD CALC: 26.8 % (ref 42–52)
HGB BLD-MCNC: 9.2 G/DL (ref 14.2–18)
LYMPHOCYTES NFR BLD AUTO: 15.8 % (ref 20–45)
MCV RBC AUTO: 89 FL (ref 80–99)
MONOCYTES NFR BLD AUTO: 10.7 % (ref 1–10)
NEUTROPHILS NFR BLD AUTO: 65.8 % (ref 45–75)
PLATELET # BLD: 102 K/UL (ref 150–450)
POTASSIUM SERPL-SCNC: 3.3 MMOL/L (ref 3.5–5.1)
RBC # BLD AUTO: 3.01 M/UL (ref 4.7–6.1)
SODIUM SERPL-SCNC: 140 MMOL/L (ref 136–145)
WBC # BLD AUTO: 6.4 K/UL (ref 4.8–10.8)

## 2020-12-07 RX ADMIN — LACTULOSE SCH GM: 20 SOLUTION ORAL at 17:31

## 2020-12-07 RX ADMIN — ASPIRIN SCH MG: 81 TABLET, DELAYED RELEASE ORAL at 09:38

## 2020-12-07 RX ADMIN — SODIUM CHLORIDE SCH MLS/HR: 0.9 INJECTION INTRAVENOUS at 09:40

## 2020-12-07 RX ADMIN — PANTOPRAZOLE SODIUM SCH MG: 40 INJECTION, POWDER, FOR SOLUTION INTRAVENOUS at 09:40

## 2020-12-07 RX ADMIN — CHLORHEXIDINE GLUCONATE SCH APPLIC: 213 SOLUTION TOPICAL at 20:38

## 2020-12-07 RX ADMIN — SODIUM CHLORIDE SCH MLS/HR: 0.9 INJECTION INTRAVENOUS at 20:39

## 2020-12-07 RX ADMIN — HUMAN INSULIN SCH MLS/HR: 100 INJECTION, SOLUTION SUBCUTANEOUS at 23:46

## 2020-12-07 RX ADMIN — LACTULOSE SCH GM: 20 SOLUTION ORAL at 09:38

## 2020-12-07 RX ADMIN — HUMAN INSULIN SCH MLS/HR: 100 INJECTION, SOLUTION SUBCUTANEOUS at 12:45

## 2020-12-07 RX ADMIN — LACTULOSE SCH GM: 20 SOLUTION ORAL at 12:44

## 2020-12-07 RX ADMIN — Medication SCH MG: at 09:38

## 2020-12-07 RX ADMIN — PANTOPRAZOLE SODIUM SCH MG: 40 INJECTION, POWDER, FOR SOLUTION INTRAVENOUS at 20:38

## 2020-12-07 RX ADMIN — DOCUSATE SODIUM SCH MG: 250 CAPSULE, LIQUID FILLED ORAL at 09:43

## 2020-12-07 RX ADMIN — VITAMIN D, TAB 1000IU (100/BT) SCH INTLU: 25 TAB at 09:38

## 2020-12-07 NOTE — CARDIAC ELECTROPHYSIOLOGY PN
Assessment/Plan


Assessment/Plan


1. S/P septic shock in this patient with lactic acid of 2.9 and white count of 

11.5.  


     Ruled out for COVID. Influenza is negative. On IV fluids. 


      Ruled out for myocardial infarction.  EF 65%.





2. Fever and cough and respiratory failure on IV antibiotic per ID. 





3. Anemia.  Hemoglobin 9.5.





4. Renal failure with BUN of 35, creatinine of 1.5.  





5. Rhabdomyolysis with CPK of 2700.





6. History of congestive heart failure with Nl EF





7. 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





8. Complete right bundle-branch block and history of bradycardia with


heart rate in the 50s.  Off any sinus or AV jonathan blocking agents.  





9. Dysphagia, S/P NGT. Family refused PEG





DW RN





Subjective


Subjective


On Abx. Being Ruled out for Covid. NGT was placed as family refused PEG. In res

traints off O2





Objective





Last 24 Hour Vital Signs








  Date Time  Temp Pulse Resp B/P (MAP) Pulse Ox O2 Delivery O2 Flow Rate FiO2


 


12/7/20 04:00 96.3 63 18 127/72 (90) 100   


 


12/7/20 04:00  62      


 


12/7/20 00:00 96.2 66 18 145/76 (99) 99   


 


12/7/20 00:00  60      


 


12/6/20 21:00      Room Air  


 


12/6/20 20:00 96.0 66 18 130/70 (90) 98   


 


12/6/20 20:00  63      


 


12/6/20 16:00  63      


 


12/6/20 16:00 97.9 67 20 129/67 (87) 100   


 


12/6/20 12:00 97.7 62 18 129/73 (91) 99   


 


12/6/20 12:00  62      

















Intake and Output  


 


 12/6/20 12/7/20





 19:00 07:00


 


Intake Total  1100 ml


 


Output Total 650 ml 


 


Balance -650 ml 1100 ml


 


  


 


IV Total  1100 ml


 


Output Urine Total 650 ml 











Laboratory Tests








Test


 12/6/20


14:00 12/7/20


06:15 12/7/20


08:50


 


Vancomycin Level Trough


 7.0 ug/mL


(5.0-12.0) 


 





 


White Blood Count


 


 6.4 K/UL


(4.8-10.8) 





 


Red Blood Count


 


 3.01 M/UL


(4.70-6.10)  L 





 


Hemoglobin


 


 9.2 G/DL


(14.2-18.0)  L 





 


Hematocrit


 


 26.8 %


(42.0-52.0)  L 





 


Mean Corpuscular Volume  89 FL (80-99)   


 


Mean Corpuscular Hemoglobin


 


 30.8 PG


(27.0-31.0) 





 


Mean Corpuscular Hemoglobin


Concent 


 34.5 G/DL


(32.0-36.0) 





 


Red Cell Distribution Width


 


 16.4 %


(11.6-14.8)  H 





 


Platelet Count


 


 102 K/UL


(150-450)  L 





 


Mean Platelet Volume


 


 7.0 FL


(6.5-10.1) 





 


Neutrophils (%) (Auto)


 


 65.8 %


(45.0-75.0) 





 


Lymphocytes (%) (Auto)


 


 15.8 %


(20.0-45.0)  L 





 


Monocytes (%) (Auto)


 


 10.7 %


(1.0-10.0)  H 





 


Eosinophils (%) (Auto)


 


 6.7 %


(0.0-3.0)  H 





 


Basophils (%) (Auto)


 


 1.0 %


(0.0-2.0) 





 


Sodium Level


 


 140 MMOL/L


(136-145) 





 


Potassium Level


 


 3.3 MMOL/L


(3.5-5.1)  L 





 


Chloride Level


 


 111 MMOL/L


()  H 





 


Carbon Dioxide Level


 


 23 MMOL/L


(21-32) 





 


Anion Gap


 


 6 mmol/L


(5-15) 





 


Blood Urea Nitrogen


 


 10 mg/dL


(7-18) 





 


Creatinine


 


 0.6 MG/DL


(0.55-1.30) 





 


Estimat Glomerular Filtration


Rate 


 > 60 mL/min


(>60) 





 


Glucose Level


 


 115 MG/DL


()  H 





 


Calcium Level


 


 7.5 MG/DL


(8.5-10.1)  L 





 


Total Bilirubin


 


 0.6 MG/DL


(0.2-1.0) 





 


Aspartate Amino Transf


(AST/SGOT) 


 49 U/L (15-37)


H 





 


Alanine Aminotransferase


(ALT/SGPT) 


 31 U/L (12-78)


 





 


Alkaline Phosphatase


 


 75 U/L


() 





 


Total Protein


 


 5.8 G/DL


(6.4-8.2)  L 





 


Albumin


 


 2.1 G/DL


(3.4-5.0)  L 





 


Globulin  3.7 g/dL   


 


Albumin/Globulin Ratio


 


 0.6 (1.0-2.7)


L 





 


Hepatitis A IgM Antibody  Pending   


 


Hepatitis B Surface Antigen  Pending   


 


Hepatitis B Core IgM Antibody  Pending   


 


Hepatitis C Antibody  Pending   


 


Ammonia   Pending  








Objective





HEAD AND NECK:  Shows no JVD. NGT 


LUNGS:  Coarse rhonchi.


CARDIOVASCULAR:  Shows regular S1 and S2 with no gallop or murmur.


ABDOMEN:  Soft.


EXTREMITIES:  No pitting edema.











Levi Otero MD                Dec 7, 2020 09:24

## 2020-12-07 NOTE — NEPHROLOGY PROGRESS NOTE
Assessment/Plan


Problem List:  


(1) MONICA (acute kidney injury)


(2) Septic shock


(3) Dehydration


(4) Electrolyte imbalance


(5) Encephalopathy


Assessment


Acute renal failure


Dehydration


Anemia


Sepsis, shock


UTI


Toxic metabolic encephalopathy


Hypoalbuminemia, malnutrition with BMI of 17.3


Elevated CPK, rhabdo


Plan


December 7: Labs were reviewed.  Abnormal electrolyte addressed.  Stable from 

renal standpoint of view.


December 6: Patient now in telemetry.  Labs reviewed.  Abnormal electrolytes 

addressed.  Continue per consultants.


December 5: Seen in ICU.  Discussed with RN.  No labs drawn today.  Will check 

labs tomorrow.  Continue per consultants.


December 4: Patient seen in ICU.  Discussed with RN.  Labs reviewed.  Stable 

from renal standpoint of view.  Ammonia is elevated.  Continue per consultants.


December 3: 


N.p.o.


Hydrate


Pressors


Ndiaye


Accurate intake and output


Avoid nephrotoxic's


Monitor renal parameters


Per orders


Per consultants





Subjective


ROS Limited/Unobtainable:  No


Constitutional:  Reports: malaise





Objective


Objective





Last 24 Hour Vital Signs








  Date Time  Temp Pulse Resp B/P (MAP) Pulse Ox O2 Delivery O2 Flow Rate FiO2


 


12/7/20 12:00  81      


 


12/7/20 12:00 96.2 78 18 139/77 (97) 97   


 


12/7/20 09:00      Room Air  


 


12/7/20 08:00 96.6 67 20 136/78 (97) 98   


 


12/7/20 08:00  70      


 


12/7/20 04:00 96.3 63 18 127/72 (90) 100   


 


12/7/20 04:00  62      


 


12/7/20 00:00 96.2 66 18 145/76 (99) 99   


 


12/7/20 00:00  60      


 


12/6/20 21:00      Room Air  


 


12/6/20 20:00 96.0 66 18 130/70 (90) 98   


 


12/6/20 20:00  63      


 


12/6/20 16:00  63      


 


12/6/20 16:00 97.9 67 20 129/67 (87) 100   

















Intake and Output  


 


 12/6/20 12/7/20





 19:00 07:00


 


Intake Total  1100 ml


 


Output Total 650 ml 


 


Balance -650 ml 1100 ml


 


  


 


IV Total  1100 ml


 


Output Urine Total 650 ml 











Current Medications








 Medications


  (Trade)  Dose


 Ordered  Sig/Kadeem


 Route


 PRN Reason  Start Time


 Stop Time Status Last Admin


Dose Admin


 


 Acetaminophen


  (Tylenol)  650 mg  Q4H  PRN


 ORAL


 Mild Pain (Pain Scale 1-3)  12/3/20 19:30


 1/2/21 19:29   





 


 Acetaminophen


  (Tylenol)  650 mg  Q4H  PRN


 ORAL


 Temp >100.5  12/3/20 19:30


 1/2/21 19:29   





 


 Aspirin


  (Ecotrin)  81 mg  DAILY


 ORAL


   12/4/20 09:00


 1/18/21 08:59  12/7/20 09:38





 


 Barium Sulfate


  (Varibar Honey)  250 ml  NOW  PRN


 MC


 RAD  12/7/20 08:45


 12/10/20 08:42   





 


 Barium Sulfate


  (Varibar Nectar)  240 ml  NOW  PRN


 MC


 RAD  12/7/20 08:45


 12/10/20 08:42   





 


 Barium Sulfate


  (Varibar Pudding)  230 ml  NOW  PRN


 MC


 RAD  12/7/20 08:45


 12/10/20 08:42   





 


 Barium Sulfate


  (Varibar Thin


 Liquid powder)  148 gm  NOW  PRN


 


 RAD  12/7/20 08:45


 12/10/20 08:42   





 


 Cefepime HCl 1 gm/


 Dextrose  55 ml @ 


 110 mls/hr  EVERY 12  HOURS


 IVPB


   12/4/20 10:30


 12/11/20 10:29  12/7/20 09:40





 


 Chlorhexidine


 Gluconate


  (Diane-Hex 2%)  1 applic  DAILY@2000


 TOPIC


   12/3/20 20:00


 3/3/21 19:59  12/6/20 23:04





 


 Dextrose/Sodium


 Chloride  1,000 ml @ 


 100 mls/hr  Q10H


 IV


   12/3/20 19:30


 1/2/21 19:29  12/7/20 12:45





 


 Docusate Sodium


  (Colace)  250 mg  DAILY


 ORAL


   12/4/20 09:00


 1/3/21 08:59  12/7/20 09:43





 


 Lactulose


  (Cephulac)  20 gm  THREE TIMES A  DAY


 ORAL


   12/6/20 13:00


 1/5/21 12:59  12/7/20 12:44





 


 Pantoprazole


  (Protonix)  40 mg  EVERY 12  HOURS


 IVP


   12/3/20 21:00


 1/2/21 20:59  12/7/20 09:40





 


 Sennosides


  (Senokot)  8.6 mg  DAILY


 ORAL


   12/4/20 09:00


 1/3/21 08:59  12/7/20 09:38





 


 Vitamin D


  (Vitamin D)  1,000 intlu  DAILY


 ORAL


   12/4/20 09:00


 1/3/21 08:59  12/7/20 09:38








Laboratory Tests


12/7/20 06:15: 


White Blood Count 6.4, Red Blood Count 3.01L, Hemoglobin 9.2L, Hematocrit 26.8L,

Mean Corpuscular Volume 89, Mean Corpuscular Hemoglobin 30.8, Mean Corpuscular 

Hemoglobin Concent 34.5, Red Cell Distribution Width 16.4H, Platelet Count 102L,

Mean Platelet Volume 7.0, Neutrophils (%) (Auto) 65.8, Lymphocytes (%) (Auto) 

15.8L, Monocytes (%) (Auto) 10.7H, Eosinophils (%) (Auto) 6.7H, Basophils (%) 

(Auto) 1.0, Sodium Level 140, Potassium Level 3.3L, Chloride Level 111H, Carbon 

Dioxide Level 23, Anion Gap 6, Blood Urea Nitrogen 10, Creatinine 0.6, Estimat 

Glomerular Filtration Rate > 60, Glucose Level 115H, Calcium Level 7.5L, 

Phosphorus Level 2.6, Magnesium Level 1.8, Total Bilirubin 0.6, Aspartate Amino 

Transf (AST/SGOT) 49H, Alanine Aminotransferase (ALT/SGPT) 31, Alkaline 

Phosphatase 75, Total Protein 5.8L, Albumin 2.1L, Globulin 3.7, Albumin/Globulin

Ratio 0.6L, Hepatitis A IgM Antibody [Pending], Hepatitis B Surface Antigen [

Pending], Hepatitis B Core IgM Antibody [Pending], Hepatitis C Antibody 

[Pending]


12/7/20 08:50: Ammonia 75H


Height (Feet):  5


Height (Inches):  8.00


Weight (Pounds):  114


General Appearance:  no apparent distress, lethargic


EENT:  other - NG tube in place


Cardiovascular:  normal rate


Respiratory/Chest:  decreased breath sounds


Abdomen:  distended











Alvin Alegria MD             Dec 7, 2020 14:03

## 2020-12-07 NOTE — PULMONOLOGY PROGRESS NOTE
Subjective


ROS Limited/Unobtainable:  Yes


Interval Events:  no major changes


Constitutional:  Reports: no symptoms


HEENT:  Repors: no symptoms


Respiratory:  Reports: no symptoms


Cardiovascular:  Reports: no symptoms


Gastrointestinal/Abdominal:  Reports: no symptoms


Genitourinary:  Reports: no symptoms


Allergies:  


Coded Allergies:  


     No Known Allergies (Unverified , 8/19/18)





Objective





Last 24 Hour Vital Signs








  Date Time  Temp Pulse Resp B/P (MAP) Pulse Ox O2 Delivery O2 Flow Rate FiO2


 


12/7/20 16:00 97.4 100 20 130/84 (99) 96   


 


12/7/20 12:00  81      


 


12/7/20 12:00 96.2 78 18 139/77 (97) 97   


 


12/7/20 09:00      Room Air  


 


12/7/20 08:00 96.6 67 20 136/78 (97) 98   


 


12/7/20 08:00  70      


 


12/7/20 04:00 96.3 63 18 127/72 (90) 100   


 


12/7/20 04:00  62      


 


12/7/20 00:00 96.2 66 18 145/76 (99) 99   


 


12/7/20 00:00  60      


 


12/6/20 21:00      Room Air  


 


12/6/20 20:00 96.0 66 18 130/70 (90) 98   


 


12/6/20 20:00  63      

















Intake and Output  


 


 12/6/20 12/7/20





 18:59 06:59


 


Intake Total  1100 ml


 


Output Total 650 ml 


 


Balance -650 ml 1100 ml


 


  


 


IV Total  1100 ml


 


Output Urine Total 650 ml 








Objective


12/7/2020 pt laying in bed watching tv; normal work of breathing on RA


12/6/2020 pulse 53-57; pt laying in bed; NAD


12/5/2020 obtunded; bradycardia; Dr. Otero aware


General Appearance:  no acute distress


HEENT:  other - NG tube


Respiratory:  chest wall non-tender, decreased breath sounds


Cardiovascular:  normal peripheral pulses


Abdomen:  normal bowel sounds, distended


Genitourinary:  other - Ndiaye


Extremities:  no cyanosis


Laboratory Tests


12/7/20 06:15: 


White Blood Count 6.4, Red Blood Count 3.01L, Hemoglobin 9.2L, Hematocrit 26.8L,

Mean Corpuscular Volume 89, Mean Corpuscular Hemoglobin 30.8, Mean Corpuscular 

Hemoglobin Concent 34.5, Red Cell Distribution Width 16.4H, Platelet Count 102L,

Mean Platelet Volume 7.0, Neutrophils (%) (Auto) 65.8, Lymphocytes (%) (Auto) 

15.8L, Monocytes (%) (Auto) 10.7H, Eosinophils (%) (Auto) 6.7H, Basophils (%) 

(Auto) 1.0, Sodium Level 140, Potassium Level 3.3L, Chloride Level 111H, Carbon 

Dioxide Level 23, Anion Gap 6, Blood Urea Nitrogen 10, Creatinine 0.6, Estimat 

Glomerular Filtration Rate > 60, Glucose Level 115H, Calcium Level 7.5L, 

Phosphorus Level 2.6, Magnesium Level 1.8, Total Bilirubin 0.6, Aspartate Amino 

Transf (AST/SGOT) 49H, Alanine Aminotransferase (ALT/SGPT) 31, Alkaline 

Phosphatase 75, Total Protein 5.8L, Albumin 2.1L, Globulin 3.7, Albumin/Globulin

Ratio 0.6L, Hepatitis A IgM Antibody [Pending], Hepatitis B Surface Antigen 

[Pending], Hepatitis B Core IgM Antibody [Pending], Hepatitis C Antibody 

[Pending]


12/7/20 08:50: Ammonia 75H





Current Medications








 Medications


  (Trade)  Dose


 Ordered  Sig/Kadeem


 Route


 PRN Reason  Start Time


 Stop Time Status Last Admin


Dose Admin


 


 Acetaminophen


  (Tylenol)  650 mg  Q4H  PRN


 ORAL


 Mild Pain (Pain Scale 1-3)  12/3/20 19:30


 1/2/21 19:29   





 


 Acetaminophen


  (Tylenol)  650 mg  Q4H  PRN


 ORAL


 Temp >100.5  12/3/20 19:30


 1/2/21 19:29   





 


 Aspirin


  (Ecotrin)  81 mg  DAILY


 ORAL


   12/4/20 09:00


 1/18/21 08:59  12/7/20 09:38





 


 Barium Sulfate


  (Varibar Honey)  250 ml  NOW  PRN


 MC


 RAD  12/7/20 08:45


 12/10/20 08:42   





 


 Barium Sulfate


  (Varibar Nectar)  240 ml  NOW  PRN


 MC


 RAD  12/7/20 08:45


 12/10/20 08:42   





 


 Barium Sulfate


  (Varibar Pudding)  230 ml  NOW  PRN


 MC


 RAD  12/7/20 08:45


 12/10/20 08:42   





 


 Barium Sulfate


  (Varibar Thin


 Liquid powder)  148 gm  NOW  PRN


 MC


 RAD  12/7/20 08:45


 12/10/20 08:42   





 


 Cefepime HCl 1 gm/


 Dextrose  55 ml @ 


 110 mls/hr  EVERY 12  HOURS


 IVPB


   12/4/20 10:30


 12/11/20 10:29  12/7/20 09:40





 


 Chlorhexidine


 Gluconate


  (Diane-Hex 2%)  1 applic  DAILY@2000


 TOPIC


   12/3/20 20:00


 3/3/21 19:59  12/6/20 23:04





 


 Dextrose/Sodium


 Chloride  1,000 ml @ 


 100 mls/hr  Q10H


 IV


   12/3/20 19:30


 1/2/21 19:29  12/7/20 12:45





 


 Docusate Sodium


  (Colace)  250 mg  DAILY


 ORAL


   12/4/20 09:00


 1/3/21 08:59  12/7/20 09:43





 


 Lactulose


  (Cephulac)  20 gm  THREE TIMES A  DAY


 ORAL


   12/6/20 13:00


 1/5/21 12:59  12/7/20 12:44





 


 Pantoprazole


  (Protonix)  40 mg  EVERY 12  HOURS


 IVP


   12/3/20 21:00


 1/2/21 20:59  12/7/20 09:40





 


 Sennosides


  (Senokot)  8.6 mg  DAILY


 ORAL


   12/4/20 09:00


 1/3/21 08:59  12/7/20 09:38





 


 Vitamin D


  (Vitamin D)  1,000 intlu  DAILY


 ORAL


   12/4/20 09:00


 1/3/21 08:59  12/7/20 09:38














Assessment/Plan


Assessment/Plan


IMPRESSION:


1. Shock, likely septic. On pressors


2. Fever.


3. History of CVA.


4. CHF.


5. History of hep C.


   - 12/7/2020 hep panel pending 





DISCUSSION: 


Continue broad-spectrum antibiotics.  IV fluids.  Pressors prn.


DVT and GI prophylaxes. 


Currently saturating well on RA





The care for this patient was discussed with my supervising physician


Time spent for this care was approximately 31 minutes.


The history of In [place] Ann has been reviewed and management options for him have been

 examined and discussed by Edu Evans. I have personally examined and 

interviewed the patient.











Michael Ashley                  Dec 7, 2020 16:40


Edu Evans MD            Dec 7, 2020 17:25

## 2020-12-07 NOTE — GENERAL PROGRESS NOTE
Subjective


ROS Limited/Unobtainable:  Yes


Allergies:  


Coded Allergies:  


     No Known Allergies (Unverified , 8/19/18)





Objective





Last 24 Hour Vital Signs








  Date Time  Temp Pulse Resp B/P (MAP) Pulse Ox O2 Delivery O2 Flow Rate FiO2


 


12/7/20 16:00  99      


 


12/7/20 16:00 97.4 100 20 130/84 (99) 96   


 


12/7/20 12:00  81      


 


12/7/20 12:00 96.2 78 18 139/77 (97) 97   


 


12/7/20 09:00      Room Air  


 


12/7/20 08:00 96.6 67 20 136/78 (97) 98   


 


12/7/20 08:00  70      


 


12/7/20 04:00 96.3 63 18 127/72 (90) 100   


 


12/7/20 04:00  62      


 


12/7/20 00:00 96.2 66 18 145/76 (99) 99   


 


12/7/20 00:00  60      

















Intake and Output  


 


 12/6/20 12/7/20





 19:00 07:00


 


Intake Total  1100 ml


 


Output Total 650 ml 


 


Balance -650 ml 1100 ml


 


  


 


IV Total  1100 ml


 


Output Urine Total 650 ml 








Laboratory Tests


12/7/20 06:15: 


White Blood Count 6.4, Red Blood Count 3.01L, Hemoglobin 9.2L, Hematocrit 26.8L,

Mean Corpuscular Volume 89, Mean Corpuscular Hemoglobin 30.8, Mean Corpuscular 

Hemoglobin Concent 34.5, Red Cell Distribution Width 16.4H, Platelet Count 102L,

Mean Platelet Volume 7.0, Neutrophils (%) (Auto) 65.8, Lymphocytes (%) (Auto) 

15.8L, Monocytes (%) (Auto) 10.7H, Eosinophils (%) (Auto) 6.7H, Basophils (%) 

(Auto) 1.0, Sodium Level 140, Potassium Level 3.3L, Chloride Level 111H, Carbon 

Dioxide Level 23, Anion Gap 6, Blood Urea Nitrogen 10, Creatinine 0.6, Estimat 

Glomerular Filtration Rate > 60, Glucose Level 115H, Calcium Level 7.5L, 

Phosphorus Level 2.6, Magnesium Level 1.8, Total Bilirubin 0.6, Aspartate Amino 

Transf (AST/SGOT) 49H, Alanine Aminotransferase (ALT/SGPT) 31, Alkaline Ph

osphatase 75, Total Protein 5.8L, Albumin 2.1L, Globulin 3.7, Albumin/Globulin 

Ratio 0.6L, Hepatitis A IgM Antibody [Pending], Hepatitis B Surface Antigen 

[Pending], Hepatitis B Core IgM Antibody [Pending], Hepatitis C Antibody 

[Pending]


12/7/20 08:50: Ammonia 75H


Height (Feet):  5


Height (Inches):  8.00


Weight (Pounds):  114





Assessment/Plan


Problem List:  


(1) Encephalopathy


ICD Codes:  G93.40 - Encephalopathy, unspecified


SNOMED:  73471458


(2) Fever


ICD Codes:  R50.9 - Fever, unspecified


SNOMED:  501884686


(3) Dehydration


ICD Codes:  E86.0 - Dehydration


SNOMED:  94412676


(4) Septic shock


ICD Codes:  A41.9 - Sepsis, unspecified organism; R65.21 - Severe sepsis with 

septic shock


SNOMED:  61453488


(5) Electrolyte imbalance


ICD Codes:  E87.8 - Other disorders of electrolyte and fluid balance, not 

elsewhere classified


SNOMED:  141005650


(6) HTN (hypertension)


ICD Codes:  I10 - Essential (primary) hypertension


SNOMED:  08581241


(7) MONICA (acute kidney injury)


ICD Codes:  N17.9 - Acute kidney failure, unspecified


SNOMED:  8877698, 01909837


Status:  progressing


Assessment/Plan:


check lytes


no acute events


agitated.consulted psychiatrist


improving clinically


arf


s/p septic shock


Martin Allison MD                   Dec 7, 2020 21:05

## 2020-12-07 NOTE — GENERAL PROGRESS NOTE
Subjective


ROS Limited/Unobtainable:  No


Allergies:  


Coded Allergies:  


     No Known Allergies (Unverified , 8/19/18)





Objective





Last 24 Hour Vital Signs








  Date Time  Temp Pulse Resp B/P (MAP) Pulse Ox O2 Delivery O2 Flow Rate FiO2


 


12/7/20 04:00 96.3 63 18 127/72 (90) 100   


 


12/7/20 04:00  62      


 


12/7/20 00:00 96.2 66 18 145/76 (99) 99   


 


12/7/20 00:00  60      


 


12/6/20 21:00      Room Air  


 


12/6/20 20:00 96.0 66 18 130/70 (90) 98   


 


12/6/20 20:00  63      


 


12/6/20 16:00  63      


 


12/6/20 16:00 97.9 67 20 129/67 (87) 100   


 


12/6/20 12:00 97.7 62 18 129/73 (91) 99   


 


12/6/20 12:00  62      


 


12/6/20 09:00      Nasal Cannula 3.0 














l


Intake and Output  


 


 12/6/20 12/7/20





 19:00 07:00


 


Intake Total  1100 ml


 


Output Total 650 ml 


 


Balance -650 ml 1100 ml


 


  


 


IV Total  1100 ml


 


Output Urine Total 650 ml 








Laboratory Tests


12/6/20 14:00: Vancomycin Level Trough 7.0


12/7/20 06:15: 


White Blood Count 6.4, Red Blood Count 3.01L, Hemoglobin 9.2L, Hematocrit 26.8L,

Mean Corpuscular Volume 89, Mean Corpuscular Hemoglobin 30.8, Mean Corpuscular 

Hemoglobin Concent 34.5, Red Cell Distribution Width 16.4H, Platelet Count 102L,

Mean Platelet Volume 7.0, Neutrophils (%) (Auto) 65.8, Lymphocytes (%) (Auto) 

15.8L, Monocytes (%) (Auto) 10.7H, Eosinophils (%) (Auto) 6.7H, Basophils (%) 

(Auto) 1.0, Sodium Level 140, Potassium Level 3.3L, Chloride Level 111H, Carbon 

Dioxide Level 23, Anion Gap 6, Blood Urea Nitrogen 10, Creatinine 0.6, Estimat 

Glomerular Filtration Rate > 60, Glucose Level 115H, Calcium Level 7.5L, Total 

Bilirubin 0.6, Aspartate Amino Transf (AST/SGOT) 49H, Alanine Aminotransferase 

(ALT/SGPT) 31, Alkaline Phosphatase 75, Ammonia [Pending], Total Protein 5.8L, 

Albumin 2.1L, Globulin 3.7, Albumin/Globulin Ratio 0.6L, Hepatitis A IgM Antibod

y [Pending], Hepatitis B Surface Antigen [Pending], Hepatitis B Core IgM 

Antibody [Pending], Hepatitis C Antibody [Pending]


Height (Feet):  5


Height (Inches):  8.00


Weight (Pounds):  114


General Appearance:  no apparent distress


EENT:  normal ENT inspection


Neck:  normal alignment


Cardiovascular:  normal rate


Respiratory/Chest:  decreased breath sounds


Abdomen:  normal bowel sounds, non tender, soft


Extremities:  non-tender





Assessment/Plan


Status:  progressing


Assessment/Plan:





1. History of CVA.


2. Schizophrenia.


3. Anemia.


4. Hepatitis C.


5. Dysphagia.


6. Hepatic encephalopathy.








NGTF


swallow eval for today


fu ammonia level


fu abd us











Neal De La Cruz MD              Dec 7, 2020 08:33

## 2020-12-07 NOTE — INFECTIOUS DISEASES PROG NOTE
Assessment/Plan


Assessment/Plan


IMPRESSION:  


Sepsis with septic shock,resolving


Dehydration, 


Lactic acidosis, 


Rhabdomyolysis, 


Acute renal failure.  


Cirrhosis and portal hypertension.  


Status post splenectomy, 


Hepatitis C.


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


Positive blood culture: contamination





RECOMMENDATION:  


Continue with cefepime 


We will follow up the cultures.





Subjective


ROS Limited/Unobtainable:  Yes


Neurologic:  Reports: confusion, other - on restraint


Allergies:  


Coded Allergies:  


     No Known Allergies (Unverified , 8/19/18)





Objective





Last 24 Hour Vital Signs








  Date Time  Temp Pulse Resp B/P (MAP) Pulse Ox O2 Delivery O2 Flow Rate FiO2


 


12/7/20 04:00 96.3 63 18 127/72 (90) 100   


 


12/7/20 04:00  62      


 


12/7/20 00:00 96.2 66 18 145/76 (99) 99   


 


12/7/20 00:00  60      


 


12/6/20 21:00      Room Air  


 


12/6/20 20:00 96.0 66 18 130/70 (90) 98   


 


12/6/20 20:00  63      


 


12/6/20 16:00  63      


 


12/6/20 16:00 97.9 67 20 129/67 (87) 100   


 


12/6/20 12:00 97.7 62 18 129/73 (91) 99   


 


12/6/20 12:00  62      








Height (Feet):  5


Height (Inches):  8.00


Weight (Pounds):  114


HEENT:  mucous membranes moist


Respiratory/Chest:  lungs clear


Cardiovascular:  normal rate


Abdomen:  soft, non tender, other - NG tube feeding


Extremities:  other - edema


Neurologic/Psychiatric:  other - sleeping





Laboratory Tests








Test


 12/6/20


14:00 12/7/20


06:15 12/7/20


08:50


 


Vancomycin Level Trough


 7.0 ug/mL


(5.0-12.0) 


 





 


White Blood Count


 


 6.4 K/UL


(4.8-10.8) 





 


Red Blood Count


 


 3.01 M/UL


(4.70-6.10)  L 





 


Hemoglobin


 


 9.2 G/DL


(14.2-18.0)  L 





 


Hematocrit


 


 26.8 %


(42.0-52.0)  L 





 


Mean Corpuscular Volume  89 FL (80-99)   


 


Mean Corpuscular Hemoglobin


 


 30.8 PG


(27.0-31.0) 





 


Mean Corpuscular Hemoglobin


Concent 


 34.5 G/DL


(32.0-36.0) 





 


Red Cell Distribution Width


 


 16.4 %


(11.6-14.8)  H 





 


Platelet Count


 


 102 K/UL


(150-450)  L 





 


Mean Platelet Volume


 


 7.0 FL


(6.5-10.1) 





 


Neutrophils (%) (Auto)


 


 65.8 %


(45.0-75.0) 





 


Lymphocytes (%) (Auto)


 


 15.8 %


(20.0-45.0)  L 





 


Monocytes (%) (Auto)


 


 10.7 %


(1.0-10.0)  H 





 


Eosinophils (%) (Auto)


 


 6.7 %


(0.0-3.0)  H 





 


Basophils (%) (Auto)


 


 1.0 %


(0.0-2.0) 





 


Sodium Level


 


 140 MMOL/L


(136-145) 





 


Potassium Level


 


 3.3 MMOL/L


(3.5-5.1)  L 





 


Chloride Level


 


 111 MMOL/L


()  H 





 


Carbon Dioxide Level


 


 23 MMOL/L


(21-32) 





 


Anion Gap


 


 6 mmol/L


(5-15) 





 


Blood Urea Nitrogen


 


 10 mg/dL


(7-18) 





 


Creatinine


 


 0.6 MG/DL


(0.55-1.30) 





 


Estimat Glomerular Filtration


Rate 


 > 60 mL/min


(>60) 





 


Glucose Level


 


 115 MG/DL


()  H 





 


Calcium Level


 


 7.5 MG/DL


(8.5-10.1)  L 





 


Phosphorus Level


 


 2.6 MG/DL


(2.5-4.9) 





 


Magnesium Level


 


 1.8 MG/DL


(1.8-2.4) 





 


Total Bilirubin


 


 0.6 MG/DL


(0.2-1.0) 





 


Aspartate Amino Transf


(AST/SGOT) 


 49 U/L (15-37)


H 





 


Alanine Aminotransferase


(ALT/SGPT) 


 31 U/L (12-78)


 





 


Alkaline Phosphatase


 


 75 U/L


() 





 


Total Protein


 


 5.8 G/DL


(6.4-8.2)  L 





 


Albumin


 


 2.1 G/DL


(3.4-5.0)  L 





 


Globulin  3.7 g/dL   


 


Albumin/Globulin Ratio


 


 0.6 (1.0-2.7)


L 





 


Hepatitis A IgM Antibody  Pending   


 


Hepatitis B Surface Antigen  Pending   


 


Hepatitis B Core IgM Antibody  Pending   


 


Hepatitis C Antibody  Pending   


 


Ammonia


 


 


 75 umol/L


(11-32)  H











Current Medications








 Medications


  (Trade)  Dose


 Ordered  Sig/Kadeem


 Route


 PRN Reason  Start Time


 Stop Time Status Last Admin


Dose Admin


 


 Acetaminophen


  (Tylenol)  650 mg  Q4H  PRN


 ORAL


 Mild Pain (Pain Scale 1-3)  12/3/20 19:30


 1/2/21 19:29   





 


 Acetaminophen


  (Tylenol)  650 mg  Q4H  PRN


 ORAL


 Temp >100.5  12/3/20 19:30


 1/2/21 19:29   





 


 Aspirin


  (Ecotrin)  81 mg  DAILY


 ORAL


   12/4/20 09:00


 1/18/21 08:59  12/7/20 09:38





 


 Barium Sulfate


  (Varibar Honey)  250 ml  NOW  PRN


 


 RAD  12/7/20 08:45


 12/10/20 08:42   





 


 Barium Sulfate


  (Varibar Nectar)  240 ml  NOW  PRN


 


 RAD  12/7/20 08:45


 12/10/20 08:42   





 


 Barium Sulfate


  (Varibar Pudding)  230 ml  NOW  PRN


 


 RAD  12/7/20 08:45


 12/10/20 08:42   





 


 Barium Sulfate


  (Varibar Thin


 Liquid powder)  148 gm  NOW  PRN


 


 RAD  12/7/20 08:45


 12/10/20 08:42   





 


 Cefepime HCl 1 gm/


 Dextrose  55 ml @ 


 110 mls/hr  EVERY 12  HOURS


 IVPB


   12/4/20 10:30


 12/11/20 10:29  12/7/20 09:40





 


 Chlorhexidine


 Gluconate


  (Diane-Hex 2%)  1 applic  DAILY@2000


 TOPIC


   12/3/20 20:00


 3/3/21 19:59  12/6/20 23:04





 


 Dextrose/Sodium


 Chloride  1,000 ml @ 


 100 mls/hr  Q10H


 IV


   12/3/20 19:30


 1/2/21 19:29  12/6/20 23:05





 


 Docusate Sodium


  (Colace)  250 mg  DAILY


 ORAL


   12/4/20 09:00


 1/3/21 08:59  12/7/20 09:43





 


 Lactulose


  (Cephulac)  20 gm  THREE TIMES A  DAY


 ORAL


   12/6/20 13:00


 1/5/21 12:59  12/7/20 09:38





 


 Pantoprazole


  (Protonix)  40 mg  EVERY 12  HOURS


 IVP


   12/3/20 21:00


 1/2/21 20:59  12/7/20 09:40





 


 Sennosides


  (Senokot)  8.6 mg  DAILY


 ORAL


   12/4/20 09:00


 1/3/21 08:59  12/7/20 09:38





 


 Vitamin D


  (Vitamin D)  1,000 intlu  DAILY


 ORAL


   12/4/20 09:00


 1/3/21 08:59  12/7/20 09:38




















Jeremy Martin MD                Dec 7, 2020 11:29

## 2020-12-07 NOTE — PSYCHIATRY CONSULTATION
Psychiatry Consultation


Psychiatry Consultation


Chief Complaint:  Fever


History of Present Illness:


This is a 70-year-old man who has been


admitted to the hospital due to altered mental status.  The patient has a


history of dementia, CHF, hepatitis C, constipation.  He has been


admitted to the hospital for medical stabilization.  The patient is


presenting with agitation, confusion, inability to answer any questions.





PAST PSYCHIATRIC HISTORY:  Dementia.





PAST MEDICAL HISTORY:  As above.





ALLERGIES:  No known drug allergies.





SUBSTANCE ABUSE HISTORY:  No known drug history noted.  Toxicology is


negative for all drugs.





MENTAL STATUS EXAMINATION:  The patient is alert, confused, disoriented.


Mood is agitated.  Affect is flat.  Thought process, there is a paucity of


thought content.  Thought content, no suicidal or homicidal ideation.


Cognition is impaired.  Insight and judgment is impaired.





ASSESSMENT:


1. Dementia with behavior disturbance.


2. Acute encephalopathy.





PLAN:


1. We will continue current medication.


2. Provide the patient with reality orientation and supportive


therapy.


Allergies:  


Coded Allergies:  


     No Known Allergies (Unverified , 8/19/18)





Medication History


Scheduled


Ascorbic Acid* (Ascorbic Acid*), 500 MG ORAL TWICE A DAY, (Reported)


Atorvastatin Calcium* (Lipitor*), 80 MG ORAL BEDTIME, (Reported)


Benztropine Mesylate* (Benztropine Mesylate*), 1 MG ORAL BEDTIME, (Reported)


Docusate Sodium* (Docusate Sodium*), 250 MG ORAL DAILY, (Reported)


Losartan Potassium* (Losartan Potassium*), 50 MG ORAL DAILY, (Reported)


Metoprolol Tartrate* (Metoprolol Tartrate*), 25 MG ORAL DAILY WITH BREAKFAST, 

(Reported)


Mineral Oil (Mineral Oil), 30 CU MC BEDTIME, (Reported)


Multivitamin With Minerals (Multivitamins With Minerals*), 1 TAB ORAL DAILY, 

(Reported)


Olanzapine* (Zyprexa*), 5 MG ORAL BID, (Reported)


Pantoprazole* (Protonix*), 40 MG ORAL DAILY, (Reported)


Sennosides* (Sen-O-Tab*), 8.6 MG ORAL DAILY, (Reported)


Zinc Sulfate (Zinc Sulfate*), 2 CAP ORAL DAILY, (Reported)





Scheduled PRN


Acetaminophen* (Acetaminophen 325MG Tablet*), 650 MG ORAL Q4H PRN for Mild 

Pain/Temp > 100.5, (Reported)


Acetaminophen* (Tylenol Extra Strength*), 1,000 MG ORAL Q4H PRN for Moderate 

Pain (Pain Scale 4-6), (Reported)


Bisacodyl (Dulcolax), 10 MG RC DAILY PRN for Constipation, (Reported)


Clonidine Hcl* (Catapres*), 0.1 MG ORAL EVERY 4 HOURS PRN for BP > 160, 

(Reported)


Magnesium Hydroxide* (Milk Of Magnesia*), 30 ML ORAL BEDTIME PRN for 

Constipation, (Reported)


Na Phos,M-B/Na Phos,Di-Ba (Fleet Enema), 133 ML RC Q2D PRN for Constipation, 

(Reported)





Discontinued Medications


Ascorbic Acid* (Vitamin C*), 250 MG ORAL TWICE A DAY, (Reported)


   Discontinued Reason: Prescription changed


Aspirin* (Aspir 81*), 81 MG ORAL DAILY, (Reported)


   Discontinued Reason: Pt stopped taking med


Multivitamin (Multivitamin), 1 TAB ORAL DAILY, (Reported)


   Discontinued Reason: Prescription changed


Rifaximin* (Xifaxan*), 550 MG ORAL TWICE A DAY, (Reported)


   Discontinued Reason: Pt stopped taking med


Vitamin D (Vitamin D3), 1,000 MG ORAL DAILY, (Reported)


   Discontinued Reason: Pt stopped taking med





Objective Data


Height (Feet):  5


Height (Inches):  8.00


Weight (Pounds):  114











Deana Duran MD               Dec 7, 2020 23:12

## 2020-12-08 VITALS — DIASTOLIC BLOOD PRESSURE: 67 MMHG | SYSTOLIC BLOOD PRESSURE: 124 MMHG

## 2020-12-08 VITALS — SYSTOLIC BLOOD PRESSURE: 149 MMHG | DIASTOLIC BLOOD PRESSURE: 69 MMHG

## 2020-12-08 VITALS — DIASTOLIC BLOOD PRESSURE: 59 MMHG | SYSTOLIC BLOOD PRESSURE: 116 MMHG

## 2020-12-08 LAB
ADD MANUAL DIFF: YES
ALBUMIN SERPL-MCNC: 2 G/DL (ref 3.4–5)
ALBUMIN/GLOB SERPL: 0.6 {RATIO} (ref 1–2.7)
ALP SERPL-CCNC: 73 U/L (ref 46–116)
ALT SERPL-CCNC: 34 U/L (ref 12–78)
AMMONIA PLAS-SCNC: 45 UMOL/L (ref 11–32)
AST SERPL-CCNC: 44 U/L (ref 15–37)
BILIRUB SERPL-MCNC: 0.6 MG/DL (ref 0.2–1)
BUN SERPL-MCNC: 10 MG/DL (ref 7–18)
CALCIUM SERPL-MCNC: 7.5 MG/DL (ref 8.5–10.1)
CHLORIDE SERPL-SCNC: 112 MMOL/L (ref 98–107)
CO2 SERPL-SCNC: 20 MMOL/L (ref 21–32)
CREAT SERPL-MCNC: 0.7 MG/DL (ref 0.55–1.3)
ERYTHROCYTE [DISTWIDTH] IN BLOOD BY AUTOMATED COUNT: 16.2 % (ref 11.6–14.8)
GLOBULIN SER-MCNC: 3.6 G/DL
HCT VFR BLD CALC: 24.3 % (ref 42–52)
HGB BLD-MCNC: 8.4 G/DL (ref 14.2–18)
MCV RBC AUTO: 90 FL (ref 80–99)
PHOSPHATE SERPL-MCNC: 2.4 MG/DL (ref 2.5–4.9)
PLATELET # BLD: 96 K/UL (ref 150–450)
POTASSIUM SERPL-SCNC: 3.2 MMOL/L (ref 3.5–5.1)
RBC # BLD AUTO: 2.7 M/UL (ref 4.7–6.1)
SODIUM SERPL-SCNC: 141 MMOL/L (ref 136–145)
WBC # BLD AUTO: 8.1 K/UL (ref 4.8–10.8)

## 2020-12-08 RX ADMIN — DOCUSATE SODIUM SCH MG: 250 CAPSULE, LIQUID FILLED ORAL at 09:36

## 2020-12-08 RX ADMIN — VITAMIN D, TAB 1000IU (100/BT) SCH INTLU: 25 TAB at 09:36

## 2020-12-08 RX ADMIN — HUMAN INSULIN SCH MLS/HR: 100 INJECTION, SOLUTION SUBCUTANEOUS at 09:39

## 2020-12-08 RX ADMIN — PANTOPRAZOLE SODIUM SCH MG: 40 INJECTION, POWDER, FOR SOLUTION INTRAVENOUS at 09:36

## 2020-12-08 RX ADMIN — Medication SCH MG: at 09:36

## 2020-12-08 RX ADMIN — SODIUM CHLORIDE SCH MLS/HR: 0.9 INJECTION INTRAVENOUS at 09:37

## 2020-12-08 RX ADMIN — LACTULOSE SCH GM: 20 SOLUTION ORAL at 09:36

## 2020-12-08 RX ADMIN — ASPIRIN SCH MG: 81 TABLET, DELAYED RELEASE ORAL at 09:36

## 2020-12-08 NOTE — GENERAL PROGRESS NOTE
Subjective


ROS Limited/Unobtainable:  Yes


Allergies:  


Coded Allergies:  


     No Known Allergies (Unverified , 8/19/18)





Objective





Last 24 Hour Vital Signs








  Date Time  Temp Pulse Resp B/P (MAP) Pulse Ox O2 Delivery O2 Flow Rate FiO2


 


12/8/20 04:00  84      


 


12/8/20 04:00 98.0 78 18 124/67 (86) 95   


 


12/8/20 00:00  73      


 


12/8/20 00:00 98.7 82 18 149/69 (95) 95   


 


12/7/20 21:00      Room Air  


 


12/7/20 20:00 98.6 97 24 110/72 (85) 97   


 


12/7/20 20:00  99      


 


12/7/20 16:00  99      


 


12/7/20 16:00 97.4 100 20 130/84 (99) 96   


 


12/7/20 12:00  81      


 


12/7/20 12:00 96.2 78 18 139/77 (97) 97   

















Intake and Output  


 


 12/7/20 12/8/20





 19:00 07:00


 


Output Total 250 ml 


 


Balance -250 ml 


 


  


 


Output Urine Total 250 ml 








Laboratory Tests


12/8/20 06:15: 


White Blood Count 8.1, Red Blood Count 2.70L, Hemoglobin 8.4L, Hematocrit 24.3L,

Mean Corpuscular Volume 90, Mean Corpuscular Hemoglobin 31.1H, Mean Corpuscular 

Hemoglobin Concent 34.5, Red Cell Distribution Width 16.2H, Platelet Count 96L, 

Mean Platelet Volume 7.0, Neutrophils (%) (Auto) , Lymphocytes (%) (Auto) , 

Monocytes (%) (Auto) , Eosinophils (%) (Auto) , Basophils (%) (Auto) , 

Neutrophils % (Manual) [Pending], Lymphocytes % (Manual) [Pending], Platelet 

Estimate [Pending], Platelet Morphology [Pending], Sodium Level 141, Potassium 

Level 3.2L, Chloride Level 112H, Carbon Dioxide Level 20L, Blood Urea Nitrogen 

10, Creatinine 0.7, Estimat Glomerular Filtration Rate > 60, Glucose Level 131H,

 Calcium Level 7.5L, Total Bilirubin 0.6, Aspartate Amino Transf (AST/SGOT) 44H,

 Alanine Aminotransferase (ALT/SGPT) 34, Alkaline Phosphatase 73, Ammonia 45H, 

Total Protein 5.6L, Albumin 2.0L, Globulin 3.6, Albumin/Globulin Ratio 0.6L


Height (Feet):  5


Height (Inches):  8.00


Weight (Pounds):  114


General Appearance:  no apparent distress


EENT:  normal ENT inspection


Neck:  supple


Cardiovascular:  normal rate


Respiratory/Chest:  lungs clear


Abdomen:  normal bowel sounds, non tender, soft


Extremities:  non-tender





Assessment/Plan


Status:  progressing


Assessment/Plan:





1. History of CVA.


2. Schizophrenia.


3. Anemia.


4. Hepatitis C.


5. Dysphagia.


6. Hepatic encephalopathy.





passed swallow eval


dc NGT


start diet


fu ammonia level>> improving


needs out patient fu for hep C treatment











Neal De La Cruz MD              Dec 8, 2020 10:09

## 2020-12-08 NOTE — PSYCHIATRIC PROGRESS NOTE
Psychiatry Progress Note


Psychiatry Progress Note


Neurological/Psychiatric:  Reports: anxiety, depressed, emotional problems


Allergies:  


Coded Allergies:  


     No Known Allergies (Unverified , 8/19/18)





Objective Data


Height (Feet):  5


Height (Inches):  8.00


Weight (Pounds):  114


General Appearance:  no apparent distress


Additional Comments:


 The patient is


presenting with agitation, confusion, inability to answer any questions.











MENTAL STATUS EXAMINATION:  The patient is alert, confused, disoriented.


Mood is agitated.  Affect is flat.  Thought process, there is a paucity of


thought content.  Thought content, no suicidal or homicidal ideation.


Cognition is impaired.  Insight and judgment is impaired.





ASSESSMENT:


1. Dementia with behavior disturbance.


2. Acute encephalopathy.





PLAN:


1. We will continue current medication.


2. Provide the patient with reality orientation and supportive


therapy.





Assessment/Plan


Status:  progressing











Deana Duran MD               Dec 8, 2020 20:04

## 2020-12-08 NOTE — CARDIAC ELECTROPHYSIOLOGY PN
Assessment/Plan


Assessment/Plan


1. S/P septic shock in this patient with lactic acid of 2.9 and white count of 

11.5.  


     Ruled out for COVID. Influenza is negative. On IV fluids. 


      Ruled out for myocardial infarction.  EF 65%.





2. Fever and cough and respiratory failure on IV antibiotic per ID. 





3. Anemia.  Hemoglobin 9.5.





4. Renal failure with BUN of 35, creatinine of 1.5.  





5. Rhabdomyolysis with CPK of 2700.





6. History of congestive heart failure with Nl EF





7. 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





8. Complete right bundle-branch block and history of bradycardia with


heart rate in the 50s.  Off any sinus or AV jonathan blocking agents.  





9. Dysphagia, S/P NGT. Family refused PEG


Passed swallow eval now





MARTY RN





Subjective


Subjective


On Abx. Passed swallow eval. Going to [facility] today. In restraints off O2





Objective





Last 24 Hour Vital Signs








  Date Time  Temp Pulse Resp B/P (MAP) Pulse Ox O2 Delivery O2 Flow Rate FiO2


 


12/8/20 09:00      Room Air  


 


12/8/20 08:00 97.9 75 20 116/59 (78) 96   


 


12/8/20 08:00  72      


 


12/8/20 04:00  84      


 


12/8/20 04:00 98.0 78 18 124/67 (86) 95   


 


12/8/20 00:00  73      


 


12/8/20 00:00 98.7 82 18 149/69 (95) 95   


 


12/7/20 21:00      Room Air  


 


12/7/20 20:00 98.6 97 24 110/72 (85) 97   


 


12/7/20 20:00  99      


 


12/7/20 16:00  99      


 


12/7/20 16:00 97.4 100 20 130/84 (99) 96   


 


12/7/20 12:00  81      


 


12/7/20 12:00 96.2 78 18 139/77 (97) 97   

















Intake and Output  


 


 12/7/20 12/8/20





 18:59 06:59


 


Output Total 250 ml 


 


Balance -250 ml 


 


  


 


Output Urine Total 250 ml 











Laboratory Tests








Test


 12/8/20


06:15


 


White Blood Count


 8.1 K/UL


(4.8-10.8)


 


Red Blood Count


 2.70 M/UL


(4.70-6.10)  L


 


Hemoglobin


 8.4 G/DL


(14.2-18.0)  L


 


Hematocrit


 24.3 %


(42.0-52.0)  L


 


Mean Corpuscular Volume 90 FL (80-99)  


 


Mean Corpuscular Hemoglobin


 31.1 PG


(27.0-31.0)  H


 


Mean Corpuscular Hemoglobin


Concent 34.5 G/DL


(32.0-36.0)


 


Red Cell Distribution Width


 16.2 %


(11.6-14.8)  H


 


Platelet Count


 96 K/UL


(150-450)  L


 


Mean Platelet Volume


 7.0 FL


(6.5-10.1)


 


Neutrophils (%) (Auto)


 % (45.0-75.0)





 


Lymphocytes (%) (Auto)


 % (20.0-45.0)





 


Monocytes (%) (Auto)  % (1.0-10.0)  


 


Eosinophils (%) (Auto)  % (0.0-3.0)  


 


Basophils (%) (Auto)  % (0.0-2.0)  


 


Differential Total Cells


Counted 100  





 


Neutrophils % (Manual) 75 % (45-75)  


 


Lymphocytes % (Manual) 18 % (20-45)  L


 


Monocytes % (Manual) 3 % (1-10)  


 


Eosinophils % (Manual) 4 % (0-3)  H


 


Basophils % (Manual) 0 % (0-2)  


 


Band Neutrophils 0 % (0-8)  


 


Platelet Estimate Decreased  L


 


Platelet Morphology Normal  


 


Hypochromasia 1+  


 


Anisocytosis 1+  


 


Ovalocytes Occasional  


 


Sodium Level


 141 MMOL/L


(136-145)


 


Potassium Level


 3.2 MMOL/L


(3.5-5.1)  L


 


Chloride Level


 112 MMOL/L


()  H


 


Carbon Dioxide Level


 20 MMOL/L


(21-32)  L


 


Blood Urea Nitrogen


 10 mg/dL


(7-18)


 


Creatinine


 0.7 MG/DL


(0.55-1.30)


 


Estimat Glomerular Filtration


Rate > 60 mL/min


(>60)


 


Glucose Level


 131 MG/DL


()  H


 


Calcium Level


 7.5 MG/DL


(8.5-10.1)  L


 


Phosphorus Level


 2.4 MG/DL


(2.5-4.9)  L


 


Magnesium Level


 1.6 MG/DL


(1.8-2.4)  L


 


Total Bilirubin


 0.6 MG/DL


(0.2-1.0)


 


Aspartate Amino Transf


(AST/SGOT) 44 U/L (15-37)


H


 


Alanine Aminotransferase


(ALT/SGPT) 34 U/L (12-78)





 


Alkaline Phosphatase


 73 U/L


()


 


Ammonia


 45 umol/L


(11-32)  H


 


Total Protein


 5.6 G/DL


(6.4-8.2)  L


 


Albumin


 2.0 G/DL


(3.4-5.0)  L


 


Globulin 3.6 g/dL  


 


Albumin/Globulin Ratio


 0.6 (1.0-2.7)


L








Objective





HEAD AND NECK:  Shows no JVD. NGT 


LUNGS:  Coarse rhonchi.


CARDIOVASCULAR:  Shows regular S1 and S2 with no gallop or murmur.


ABDOMEN:  Soft.


EXTREMITIES:  No pitting edema.











Levi Otero MD                Dec 8, 2020 11:56

## 2020-12-09 NOTE — DISCHARGE SUMMARY
Discharge Summary


Discharge Summary


_


Date of admission: 12/3/2020





Date of discharge: 12/8/2020





Discharged by Dr. Payan





Reason for hospitalization: 


Fever, generalized weakness





Consultants:


Cardiology Dr. Otero 


Infectious disease Dr. Martin


Neurology Dr. Fouladin 


Psychiatry Dr. Duran


Pulmonology Dr. Evans





Significant findings:


A 70-year-old male, resident of [facility], with past medical history of CHF, angina 

pectoris, anemia, hepatitis C, dysphagia, hepatic encephalopathy, was sent for 

evaluation due to fever and weakness.


Patient was saturating at 99% on 2 L oxygen


CBC revealed WBC 11.5, RBC 3.10, hemoglobin 9.5.  Chemistries were remarkable 

for BUN of 35, creatinine 1.5, calcium 8.4, AST 82, creatinine kinase 2698, CK-

MB 36. 


ABG revealed pH 7.509, pCO2 21.4, HCO3 18.79 urinalysis showed 1+ protein, 1+ 

ketones, 1+ blood


Swab for COVID-19 was negative





Procedures performed


Chest x-ray obtained in the ER does not show any acute pathology


2D echocardiogram revealed LV ejection fraction of 65%


Venous duplex was negative for DVT





Hospital course/treatment rendered


Patient presented to ER for increased fever and generalized weakness.  He also 

had hypotension and he was started on IV fluids.  A central venous catheter was 

placed emergently.  EKG in the ER showed normal sinus rhythm with a right bundle

branch block.  He was admitted under Dr. Payan's care.





An NG tube was placed for nutrition delivery.  Lactulose was added for elevated 

ammonia level.  





Proper DVT prophylaxis and GI prophylaxis given.


Over the course of his stay his vital signs and lab values stabilized.





Condition of patient on discharge


Medically stable for discharge on medical therapy.  Patient was saturating well 

on room air.  His catheter was discontinued.  Patient passed swallow evaluation.

 NG tube was discontinued and he is back on pured and nectar thick liquid diet 

with aspiration precaution.





Discharge instruction


Discharged back to [facility]





Final diagnoses


MONICA and dehydration


Septic shock


Encephalopathy


UTI


Anemia


Rhabdomyolysis


History of CHF


Lactic acidosis


Status post splenectomy





I have been assigned to dictate discharge summary for this account.











Michael Ashley                  Dec 9, 2020 17:20

## 2024-11-25 NOTE — HISTORY AND PHYSICAL REPORT
DATE OF ADMISSION:  12/03/2020

HISTORY OF PRESENT ILLNESS:  The patient is a poor historian, cannot get

any history from the patient.  The patient is here for septic shock,

hypoxia, pneumonia, admitted for those reasons.  Again, cannot get any

history from the patient.  The patient is on pressors and admitted to the

ICU for septic shock, pneumonia, and hypoxia.



PAST MEDICAL HISTORY:  Organic brain syndrome, history of CVA, history of

CHF, history of hepatitis C, history of hyperlipidemia, constipation,

psychosis, GERD.



PAST SURGICAL HISTORY:  Denies.



ALLERGIES:  No known allergies.



MEDICATIONS:  Lipitor, aspirin, Cogentin, clonidine, docusate, olanzapine,

Protonix, Senokot.



SOCIAL HISTORY:  No history of alcohol or illicit drugs.  Comes from a

nursing home.



FAMILY HISTORY:  Unable to obtain.



REVIEW OF SYSTEMS:  Unable to obtain.



PHYSICAL EXAMINATION:

VITAL SIGNS:  Temperature 97.2, pulse 78, blood pressure 100/62.

HEENT:  PERRLA.

CHEST:  Bilateral rhonchi

CARDIOVASCULAR:  Regular rate and rhythm.  No murmurs or extra sounds.

GASTROINTESTINAL:  Soft, nontender, nondistended.  No organomegaly.

 

EXTREMITIES:  No edema.

NEUROLOGIC:  Does not follow neurological exam, nonverbal.  Reflexes are

equal on both sides.



ASSESSMENT AND PLAN:

1. Septic shock on pressors.

2. History of CHF.

3. History of CVA.

4. Pneumonia.

5. Hypoxia.



I have consulted Dr. Edu Evans, Dr. Jeremy Martin, Dr. Alegria, Dr. Otero, and Dr. Martinez for nutrition purposes as well as for

malnutrition as well as for management of sepsis and pneumonia.  _____ has

been consulted to help with septic shock and the pressor orders as well as

Dr. Alegria.  The patient has low-flow oxygen initially.  Monitor the

patient very closely.  The patient has poor prognosis.









  ______________________________________________

  Martin Payan M.D.





DR:  Gilberto

D:  12/04/2020 21:26

T:  12/04/2020 21:42

JOB#:  [id number]

CC: PERRL/EOMI/conjunctiva clear/normal